# Patient Record
Sex: FEMALE | Race: WHITE | NOT HISPANIC OR LATINO
[De-identification: names, ages, dates, MRNs, and addresses within clinical notes are randomized per-mention and may not be internally consistent; named-entity substitution may affect disease eponyms.]

---

## 2017-04-26 ENCOUNTER — APPOINTMENT (OUTPATIENT)
Dept: CARDIOLOGY | Facility: CLINIC | Age: 82
End: 2017-04-26

## 2017-07-18 ENCOUNTER — RX RENEWAL (OUTPATIENT)
Age: 82
End: 2017-07-18

## 2017-10-23 ENCOUNTER — RX RENEWAL (OUTPATIENT)
Age: 82
End: 2017-10-23

## 2021-04-07 ENCOUNTER — OUTPATIENT (OUTPATIENT)
Dept: OUTPATIENT SERVICES | Facility: HOSPITAL | Age: 86
LOS: 1 days | Discharge: HOME | End: 2021-04-07
Payer: MEDICARE

## 2021-04-07 DIAGNOSIS — Z12.31 ENCOUNTER FOR SCREENING MAMMOGRAM FOR MALIGNANT NEOPLASM OF BREAST: ICD-10-CM

## 2021-04-07 DIAGNOSIS — R92.2 INCONCLUSIVE MAMMOGRAM: ICD-10-CM

## 2021-04-07 DIAGNOSIS — R92.8 OTHER ABNORMAL AND INCONCLUSIVE FINDINGS ON DIAGNOSTIC IMAGING OF BREAST: ICD-10-CM

## 2021-04-07 PROCEDURE — 76641 ULTRASOUND BREAST COMPLETE: CPT | Mod: 26,50

## 2021-04-07 PROCEDURE — 77067 SCR MAMMO BI INCL CAD: CPT | Mod: 26

## 2021-04-07 PROCEDURE — 77063 BREAST TOMOSYNTHESIS BI: CPT | Mod: 26

## 2022-04-17 ENCOUNTER — INPATIENT (INPATIENT)
Facility: HOSPITAL | Age: 87
LOS: 1 days | Discharge: HOME | End: 2022-04-19
Attending: HOSPITALIST | Admitting: HOSPITALIST
Payer: MEDICARE

## 2022-04-17 VITALS
TEMPERATURE: 98 F | HEART RATE: 71 BPM | HEIGHT: 67 IN | WEIGHT: 160.06 LBS | RESPIRATION RATE: 18 BRPM | SYSTOLIC BLOOD PRESSURE: 165 MMHG | DIASTOLIC BLOOD PRESSURE: 80 MMHG | OXYGEN SATURATION: 100 %

## 2022-04-17 LAB
ALBUMIN SERPL ELPH-MCNC: 4.5 G/DL — SIGNIFICANT CHANGE UP (ref 3.5–5.2)
ALP SERPL-CCNC: 56 U/L — SIGNIFICANT CHANGE UP (ref 30–115)
ALT FLD-CCNC: 20 U/L — SIGNIFICANT CHANGE UP (ref 0–41)
ANION GAP SERPL CALC-SCNC: 10 MMOL/L — SIGNIFICANT CHANGE UP (ref 7–14)
AST SERPL-CCNC: 19 U/L — SIGNIFICANT CHANGE UP (ref 0–41)
BASOPHILS # BLD AUTO: 0.03 K/UL — SIGNIFICANT CHANGE UP (ref 0–0.2)
BASOPHILS NFR BLD AUTO: 0.3 % — SIGNIFICANT CHANGE UP (ref 0–1)
BILIRUB SERPL-MCNC: 0.4 MG/DL — SIGNIFICANT CHANGE UP (ref 0.2–1.2)
BUN SERPL-MCNC: 14 MG/DL — SIGNIFICANT CHANGE UP (ref 10–20)
CALCIUM SERPL-MCNC: 9.8 MG/DL — SIGNIFICANT CHANGE UP (ref 8.5–10.1)
CHLORIDE SERPL-SCNC: 103 MMOL/L — SIGNIFICANT CHANGE UP (ref 98–110)
CO2 SERPL-SCNC: 29 MMOL/L — SIGNIFICANT CHANGE UP (ref 17–32)
CREAT SERPL-MCNC: 0.9 MG/DL — SIGNIFICANT CHANGE UP (ref 0.7–1.5)
EGFR: 63 ML/MIN/1.73M2 — SIGNIFICANT CHANGE UP
EOSINOPHIL # BLD AUTO: 0.13 K/UL — SIGNIFICANT CHANGE UP (ref 0–0.7)
EOSINOPHIL NFR BLD AUTO: 1.5 % — SIGNIFICANT CHANGE UP (ref 0–8)
FLUAV AG NPH QL: SIGNIFICANT CHANGE UP
FLUBV AG NPH QL: SIGNIFICANT CHANGE UP
GLUCOSE SERPL-MCNC: 119 MG/DL — HIGH (ref 70–99)
HCT VFR BLD CALC: 38.3 % — SIGNIFICANT CHANGE UP (ref 37–47)
HGB BLD-MCNC: 12.5 G/DL — SIGNIFICANT CHANGE UP (ref 12–16)
IMM GRANULOCYTES NFR BLD AUTO: 0.2 % — SIGNIFICANT CHANGE UP (ref 0.1–0.3)
LYMPHOCYTES # BLD AUTO: 1.61 K/UL — SIGNIFICANT CHANGE UP (ref 1.2–3.4)
LYMPHOCYTES # BLD AUTO: 18.4 % — LOW (ref 20.5–51.1)
MAGNESIUM SERPL-MCNC: 2 MG/DL — SIGNIFICANT CHANGE UP (ref 1.8–2.4)
MCHC RBC-ENTMCNC: 30 PG — SIGNIFICANT CHANGE UP (ref 27–31)
MCHC RBC-ENTMCNC: 32.6 G/DL — SIGNIFICANT CHANGE UP (ref 32–37)
MCV RBC AUTO: 92.1 FL — SIGNIFICANT CHANGE UP (ref 81–99)
MONOCYTES # BLD AUTO: 0.67 K/UL — HIGH (ref 0.1–0.6)
MONOCYTES NFR BLD AUTO: 7.7 % — SIGNIFICANT CHANGE UP (ref 1.7–9.3)
NEUTROPHILS # BLD AUTO: 6.27 K/UL — SIGNIFICANT CHANGE UP (ref 1.4–6.5)
NEUTROPHILS NFR BLD AUTO: 71.9 % — SIGNIFICANT CHANGE UP (ref 42.2–75.2)
NRBC # BLD: 0 /100 WBCS — SIGNIFICANT CHANGE UP (ref 0–0)
NT-PROBNP SERPL-SCNC: 78 PG/ML — SIGNIFICANT CHANGE UP (ref 0–300)
PLATELET # BLD AUTO: 286 K/UL — SIGNIFICANT CHANGE UP (ref 130–400)
POTASSIUM SERPL-MCNC: 4.3 MMOL/L — SIGNIFICANT CHANGE UP (ref 3.5–5)
POTASSIUM SERPL-SCNC: 4.3 MMOL/L — SIGNIFICANT CHANGE UP (ref 3.5–5)
PROT SERPL-MCNC: 7 G/DL — SIGNIFICANT CHANGE UP (ref 6–8)
RBC # BLD: 4.16 M/UL — LOW (ref 4.2–5.4)
RBC # FLD: 12.7 % — SIGNIFICANT CHANGE UP (ref 11.5–14.5)
RSV RNA NPH QL NAA+NON-PROBE: SIGNIFICANT CHANGE UP
SARS-COV-2 RNA SPEC QL NAA+PROBE: SIGNIFICANT CHANGE UP
SODIUM SERPL-SCNC: 142 MMOL/L — SIGNIFICANT CHANGE UP (ref 135–146)
TROPONIN T SERPL-MCNC: <0.01 NG/ML — SIGNIFICANT CHANGE UP
TROPONIN T SERPL-MCNC: <0.01 NG/ML — SIGNIFICANT CHANGE UP
WBC # BLD: 8.73 K/UL — SIGNIFICANT CHANGE UP (ref 4.8–10.8)
WBC # FLD AUTO: 8.73 K/UL — SIGNIFICANT CHANGE UP (ref 4.8–10.8)

## 2022-04-17 PROCEDURE — 99222 1ST HOSP IP/OBS MODERATE 55: CPT

## 2022-04-17 PROCEDURE — 93010 ELECTROCARDIOGRAM REPORT: CPT

## 2022-04-17 PROCEDURE — 99285 EMERGENCY DEPT VISIT HI MDM: CPT | Mod: FS,CS

## 2022-04-17 PROCEDURE — 71045 X-RAY EXAM CHEST 1 VIEW: CPT | Mod: 26

## 2022-04-17 RX ORDER — PANTOPRAZOLE SODIUM 20 MG/1
40 TABLET, DELAYED RELEASE ORAL
Refills: 0 | Status: DISCONTINUED | OUTPATIENT
Start: 2022-04-17 | End: 2022-04-19

## 2022-04-17 RX ORDER — SODIUM CHLORIDE 9 MG/ML
3 INJECTION INTRAMUSCULAR; INTRAVENOUS; SUBCUTANEOUS EVERY 8 HOURS
Refills: 0 | Status: DISCONTINUED | OUTPATIENT
Start: 2022-04-17 | End: 2022-04-19

## 2022-04-17 RX ORDER — AMLODIPINE BESYLATE 2.5 MG/1
5 TABLET ORAL DAILY
Refills: 0 | Status: DISCONTINUED | OUTPATIENT
Start: 2022-04-17 | End: 2022-04-19

## 2022-04-17 RX ORDER — ACETAMINOPHEN 500 MG
650 TABLET ORAL EVERY 6 HOURS
Refills: 0 | Status: DISCONTINUED | OUTPATIENT
Start: 2022-04-17 | End: 2022-04-19

## 2022-04-17 RX ORDER — AMLODIPINE BESYLATE 2.5 MG/1
1 TABLET ORAL
Qty: 0 | Refills: 0 | DISCHARGE

## 2022-04-17 RX ORDER — DULOXETINE HYDROCHLORIDE 30 MG/1
1 CAPSULE, DELAYED RELEASE ORAL
Qty: 0 | Refills: 0 | DISCHARGE

## 2022-04-17 RX ORDER — METOPROLOL TARTRATE 50 MG
1 TABLET ORAL
Qty: 0 | Refills: 0 | DISCHARGE

## 2022-04-17 RX ORDER — METOPROLOL TARTRATE 50 MG
50 TABLET ORAL DAILY
Refills: 0 | Status: DISCONTINUED | OUTPATIENT
Start: 2022-04-17 | End: 2022-04-19

## 2022-04-17 RX ORDER — ASPIRIN/CALCIUM CARB/MAGNESIUM 324 MG
81 TABLET ORAL DAILY
Refills: 0 | Status: DISCONTINUED | OUTPATIENT
Start: 2022-04-17 | End: 2022-04-19

## 2022-04-17 RX ORDER — CHLORHEXIDINE GLUCONATE 213 G/1000ML
1 SOLUTION TOPICAL
Refills: 0 | Status: DISCONTINUED | OUTPATIENT
Start: 2022-04-17 | End: 2022-04-19

## 2022-04-17 RX ORDER — NITROGLYCERIN 6.5 MG
0.4 CAPSULE, EXTENDED RELEASE ORAL
Refills: 0 | Status: DISCONTINUED | OUTPATIENT
Start: 2022-04-17 | End: 2022-04-19

## 2022-04-17 RX ORDER — OMEPRAZOLE 10 MG/1
1 CAPSULE, DELAYED RELEASE ORAL
Qty: 0 | Refills: 0 | DISCHARGE

## 2022-04-17 RX ORDER — ENOXAPARIN SODIUM 100 MG/ML
40 INJECTION SUBCUTANEOUS EVERY 24 HOURS
Refills: 0 | Status: DISCONTINUED | OUTPATIENT
Start: 2022-04-17 | End: 2022-04-19

## 2022-04-17 RX ORDER — DULOXETINE HYDROCHLORIDE 30 MG/1
60 CAPSULE, DELAYED RELEASE ORAL DAILY
Refills: 0 | Status: DISCONTINUED | OUTPATIENT
Start: 2022-04-17 | End: 2022-04-19

## 2022-04-17 RX ORDER — AMLODIPINE BESYLATE 2.5 MG/1
10 TABLET ORAL DAILY
Refills: 0 | Status: DISCONTINUED | OUTPATIENT
Start: 2022-04-17 | End: 2022-04-17

## 2022-04-17 RX ADMIN — AMLODIPINE BESYLATE 10 MILLIGRAM(S): 2.5 TABLET ORAL at 14:55

## 2022-04-17 RX ADMIN — PANTOPRAZOLE SODIUM 40 MILLIGRAM(S): 20 TABLET, DELAYED RELEASE ORAL at 14:55

## 2022-04-17 RX ADMIN — SODIUM CHLORIDE 3 MILLILITER(S): 9 INJECTION INTRAMUSCULAR; INTRAVENOUS; SUBCUTANEOUS at 14:37

## 2022-04-17 RX ADMIN — DULOXETINE HYDROCHLORIDE 60 MILLIGRAM(S): 30 CAPSULE, DELAYED RELEASE ORAL at 14:55

## 2022-04-17 RX ADMIN — SODIUM CHLORIDE 3 MILLILITER(S): 9 INJECTION INTRAMUSCULAR; INTRAVENOUS; SUBCUTANEOUS at 21:03

## 2022-04-17 RX ADMIN — Medication 50 MILLIGRAM(S): at 14:55

## 2022-04-17 RX ADMIN — Medication 81 MILLIGRAM(S): at 14:55

## 2022-04-17 RX ADMIN — ENOXAPARIN SODIUM 40 MILLIGRAM(S): 100 INJECTION SUBCUTANEOUS at 16:36

## 2022-04-17 NOTE — PATIENT PROFILE ADULT - FALL HARM RISK - HARM RISK INTERVENTIONS

## 2022-04-17 NOTE — ED PROVIDER NOTE - ATTENDING CONTRIBUTION TO CARE
86 yr old f w/ a pmh significant for aortic and mitral valve regurgitation who presents with chest pain. Pt states that she has been having chest pain and sob since yesterday. Pt reports that this occurs when she cleans around the home or/and goes up stairs. Pt has not had a cardiology evaluation in over 5 yrs. Pt denies any other medical complaints.     VITAL SIGNS: I have reviewed nursing notes and confirm.  CONSTITUTIONAL: non-toxic, well appearing  SKIN: no rash, no petechiae.  EYES: EOMI, pink conjunctiva, anicteric  ENT: tongue midline, no exudates, MMM  NECK: Supple; no meningismus, no JVD  CARD: RRR, no murmurs, equal radial pulses bilaterally 2+  RESP: CTAB, no respiratory distress  ABD: Soft, non-tender, non-distended, no peritoneal signs, no HSM, no CVA tenderness  EXT: Normal ROM x4.   NEURO: Alert, oriented x3.     a/p  86 yr old f that presents with chest pain   -labs  -ekg  -imaging  -consider admission

## 2022-04-17 NOTE — ED PROVIDER NOTE - NS ED ATTENDING STATEMENT MOD
This was a shared visit with the NONA. I reviewed and verified the documentation and independently performed the documented:

## 2022-04-17 NOTE — CONSULT NOTE ADULT - SUBJECTIVE AND OBJECTIVE BOX
HPI:  86 yr old f w/  pmh significant for aortic and mitral valve regurgitation/ HTN/ depression  who presents with chest pain. Pain onset was around 7am upon waking up and then pt. got nervous. Pain was a 5/10 substernal nonradiated. denies diaphoresis / jaw or shoulder pain . Pt reports that this occurs when she cleans around the home or/and goes up stairs. Upon coming to ER, pain resolved and was asymptomatic.  Pt denies any other medical complaints. denies nvd/ fever/ chills / abd pain    meds: Norvasc / Metoprolol / Cymbalta    visits cards MD regularly and has echo done 2-3x a year , last visit and echo 1 month ago and as per pt no new changes  (17 Apr 2022 11:12)        HPI-Cardiology   Pt evaluated at bedside. Currently admitted in telemetry for eval of Chest Pain Radiology tests and hospital records, were reviewed, as well as previous notes on this patient. Pt states that when she wok up this morning she suddenly developed midsternal CP. Describes the pain as sharp, intermittent, non radiating, 5/10 in intensity. Endorses worse on exertion when she ambulates or goes up the stairs. Pt denies any similar symptoms in the past. Endorses that about 6 years ago, she had LHC, after an abnormal stress test, and no stents or CAD. Pt states the follows up with her cardiologist Dr Luciano regularly and has an echo every 6 months due to having mitral valve regurgitation. Pt currently chest "discomfort" 1-2/10 in intensity, denies any SOB, palpitations, diaphoresis, chills, or nausea/vomiting          PAST MEDICAL & SURGICAL HISTORY  High blood pressure    GERD (gastroesophageal reflux disease)    Neuropathy        FAMILY HISTORY:  FAMILY HISTORY:      SOCIAL HISTORY:  []smoker-denies  []Alcohol-occasionally  []Drug-denies        ALLERGIES:  No Known Allergies      MEDICATIONS:  MEDICATIONS  (STANDING):  amLODIPine   Tablet 5 milliGRAM(s) Oral daily  aspirin  chewable 81 milliGRAM(s) Oral daily  chlorhexidine 4% Liquid 1 Application(s) Topical <User Schedule>  DULoxetine 60 milliGRAM(s) Oral daily  enoxaparin Injectable 40 milliGRAM(s) SubCutaneous every 24 hours  metoprolol succinate ER 50 milliGRAM(s) Oral daily  pantoprazole    Tablet 40 milliGRAM(s) Oral before breakfast  sodium chloride 0.9% lock flush 3 milliLiter(s) IV Push every 8 hours    MEDICATIONS  (PRN):  acetaminophen     Tablet .. 650 milliGRAM(s) Oral every 6 hours PRN Mild Pain (1 - 3)  nitroglycerin     SubLingual 0.4 milliGRAM(s) SubLingual every 5 minutes PRN Chest Pain      HOME MEDICATIONS:  Home Medications:  amLODIPine 10 mg oral tablet: 1 tab(s) orally once a day (17 Apr 2022 10:31)  Cymbalta 60 mg oral delayed release capsule: 1 cap(s) orally once a day (17 Apr 2022 10:31)  metoprolol succinate 50 mg oral tablet, extended release: 1 tab(s) orally once a day (17 Apr 2022 10:31)  PriLOSEC 20 mg oral delayed release capsule: 1 cap(s) orally once a day (17 Apr 2022 10:31)      VITALS:   T(F): 97 (04-17 @ 14:17), Max: 98 (04-17 @ 09:00)  HR: 69 (04-17 @ 14:17) (68 - 71)  BP: 130/61 (04-17 @ 14:17) (130/61 - 165/80)  BP(mean): --  RR: 18 (04-17 @ 09:00) (18 - 18)  SpO2: 100% (04-17 @ 09:00) (100% - 100%)        REVIEW OF SYSTEMS:  See HPI      PHYSICAL EXAM:  NEURO: patient is awake , alert and oriented  GEN: Not in acute distress  NECK: no thyroid enlargement, no JVD  LUNGS: Clear to auscultation bilaterally   CARDIOVASCULAR: S1/S2 present, RRR , no murmurs or rubs, no carotid bruits,  + PP bilaterally  ABD: Soft, non-tender, non-distended, +BS  EXT: No CHRIS  SKIN: Intact      LABS:                        12.5   8.73  )-----------( 286      ( 17 Apr 2022 09:30 )             38.3     04-17    142  |  103  |  14  ----------------------------<  119<H>  4.3   |  29  |  0.9    Ca    9.8      17 Apr 2022 09:30  Mg     2.0     04-17    TPro  7.0  /  Alb  4.5  /  TBili  0.4  /  DBili  x   /  AST  19  /  ALT  20  /  AlkPhos  56  04-17      Troponin T, Serum: <0.01 ng/mL (04-17-22 @ 15:11)  Troponin T, Serum: <0.01 ng/mL (04-17-22 @ 09:30)    CARDIAC MARKERS ( 17 Apr 2022 15:11 )  x     / <0.01 ng/mL / x     / x     / x      CARDIAC MARKERS ( 17 Apr 2022 09:30 )  x     / <0.01 ng/mL / x     / x     / x          Serum Pro-Brain Natriuretic Peptide: 78 pg/mL (04-17-22 @ 09:30)          RADIOLOGY:  -CXR:  < from: Xray Chest 1 View- PORTABLE-Urgent (04.17.22 @ 09:22) >  Impression:    Low lung volume.    Bibasilar opacities.    --- End of Report ---    < end of copied text >      ECG:  < from: 12 Lead ECG (04.17.22 @ 08:58) >  Sinus rhythm with occasional Premature ventricular complexes  Otherwise normal ECG    Confirmed by JOSH SCHMITZ MD ([id number]) on 4/17/2022 9:32:17 AM    < end of copied text >

## 2022-04-17 NOTE — ED PROVIDER NOTE - OBJECTIVE STATEMENT
86 year old female comes to emergency room for chest pain. Pt states that she has been having chest pain and sob since yesterday. Pt reports that this occurs when she cleans around the home or/and goes up stairs.

## 2022-04-17 NOTE — H&P ADULT - NS ATTEND AMEND GEN_ALL_CORE FT
Pt seen in 3S. Presents with non-radiating substernal pain at rest and associated with BUENROSTRO. Sounds like stable angina. Due to valvular issues (?), pt gets a TTE with her cardiologist [id number].     #stable angina  - telemetry  - Obtain recent TTE records as pt does not recall specific findings.   - ECG NSR with PVCs  - trop neg x1. obtain rop q 6hrs x2 with ecg  - nitroglycerin 0.4mg q5 mins x3 doses  - cardio consult  - if workup continues to be neg and pt is asymptomatic, op f/u with own cardiologist  - c/w aspirin and Toprol    #HTN  - c/w amlodipine    #depression  - c/w duloxetine    #GERD  - c/w ppi    #proph  - vte: lovenox

## 2022-04-17 NOTE — H&P ADULT - HISTORY OF PRESENT ILLNESS
86 yr old f w/  pmh significant for aortic and mitral valve regurgitation who presents with chest pain. Pt states that she has been having chest pain and sob since yesterday. Pt reports that this occurs when she cleans around the home or/and goes up stairs. Pt has not had a cardiology evaluation in over 5 yrs. Pt denies any other medical complaints.  86 yr old f w/  pmh significant for aortic and mitral valve regurgitation/ HTN/ depression  who presents with chest pain. Pt states that she has been having chest pain and sob since yesterday. Pt reports that this occurs when she cleans around the home or/and goes up stairs. Pt has not had a cardiology evaluation in over 5 yrs. Pt denies any other medical complaints.     meds: Norvasc / Metoprolol / Cymbalta   86 yr old f w/  pmh significant for aortic and mitral valve regurgitation/ HTN/ depression  who presents with chest pain. Pain onset was around 7am upon waking up and then pt. got nervous. Pain was a 5/10 substernal nonradiated. denies diaphoresis / jaw or shoulder pain . Pt reports that this occurs when she cleans around the home or/and goes up stairs. Upon coming to ER, pain resolved and was asymptomatic.  Pt denies any other medical complaints. denies nvd/ fever/ chills / abd pain    meds: Norvasc / Metoprolol / Cymbalta    visits cards MD regularly and has echo done 2-3x a year , last visit and echo 1 month ago and as per pt no new changes

## 2022-04-17 NOTE — ED ADULT NURSE NOTE - NSICDXPASTMEDICALHX_GEN_ALL_CORE_FT
PAST MEDICAL HISTORY:  GERD (gastroesophageal reflux disease)     High blood pressure     Neuropathy

## 2022-04-17 NOTE — H&P ADULT - NSHPPHYSICALEXAM_GEN_ALL_CORE
Vital Signs Last 24 Hrs  T(C): 36.7 (17 Apr 2022 09:00), Max: 36.7 (17 Apr 2022 09:00)  T(F): 98 (17 Apr 2022 09:00), Max: 98 (17 Apr 2022 09:00)  HR: 71 (17 Apr 2022 09:00) (71 - 71)  BP: 165/80 (17 Apr 2022 09:00) (165/80 - 165/80)  RR: 18 (17 Apr 2022 09:00) (18 - 18)  SpO2: 100% (17 Apr 2022 09:00) (100% - 100%)    Constitutional: well-nourished, appears stated age, no acute distress  Eyes: Conjunctiva pink, Sclera clear, EOMI.  Cardiovascular: S1 and S2, regular rate, regular rhythm, well-perfused extremities, radial pulses equal and 2+  Respiratory: unlabored respiratory effort, clear to auscultation bilaterally no wheezing, rales and rhonchi  Gastrointestinal: soft, non-tender abdomen, no pulsatile mass, normal bowl sounds  Musculoskeletal: supple neck, no lower extremity edema, no midline tenderness  Integumentary: warm, dry, no rash  Neurologic: awake, alert, cranial nerves II-XII grossly intact, extremities’ motor and sensory functions grossly intact  Psychiatric: appropriate mood, appropriate affect

## 2022-04-17 NOTE — H&P ADULT - NSHPLABSRESULTS_GEN_ALL_CORE
04-17    142  |  103  |  14  ----------------------------<  119<H>  4.3   |  29  |  0.9    Ca    9.8      17 Apr 2022 09:30  Mg     2.0     04-17    TPro  7.0  /  Alb  4.5  /  TBili  0.4  /  DBili  x   /  AST  19  /  ALT  20  /  AlkPhos  56  04-17                            12.5   8.73  )-----------( 286      ( 17 Apr 2022 09:30 )             38.3     CAPILLARY BLOOD GLUCOSE        CARDIAC MARKERS ( 17 Apr 2022 09:30 )  x     / <0.01 ng/mL / x     / x     / x        < from: Xray Chest 1 View- PORTABLE-Urgent (04.17.22 @ 09:22) >    Impression:    Low lung volume.    Bibasilar opacities.    < end of copied text >    < from: 12 Lead ECG (04.17.22 @ 08:58) >      Diagnosis Line Sinus rhythm with occasional Premature ventricular complexes  Otherwise normal ECG    < end of copied text >

## 2022-04-17 NOTE — CONSULT NOTE ADULT - NS ATTEND AMEND GEN_ALL_CORE FT
Patient yesterday chest pain at rest with belching . Hx ar mr. Today vague chest pain walking. If enzymes neg. Will consider adenocard thallium

## 2022-04-17 NOTE — H&P ADULT - ASSESSMENT
86 yr old f w/  pmh significant for aortic and mitral valve regurgitation who presents with chest pain, especially with exertion and SOB.     86 yr old f w/  pmh significant for HTN/ depression / aortic and mitral valve regurgitation who presents with chest pain, especially with exertion and SOB.     86 yr old f w/  pmh significant for HTN/ depression / aortic and mitral valve regurgitation who presents with chest pain, especially with exertion and SOB.  now asymptomatic     - Admit to inpatient level of care - non-ccu tele   - Ambulate w/ assistance  - AM labs   - CHG bath  - DASH low na diet   - VTE ppx: lovenox   - GI ppx: protonix      # CHEST PAIN - ANGINA , WITH H/O HTN AND VALVE REGURG.   # r/o ACS  - trop x 1 flat , will order trop at 3pm and in am   - cards consult   - nitro prn   - o2 as needed   - monitor vitals   - no echo needed for now   - poss d/c in am if w/u negative     # HTN   - c/w norvasc/ metoprolol   - low sodium diet     # depression   - c/w cymbalta     d/w Dr. Rawls  86 yr old f w/  pmh significant for HTN/ depression / aortic and mitral valve regurgitation who presents with chest pain, especially with exertion and SOB.  now asymptomatic     - Admit to inpatient level of care - non-ccu tele   - Ambulate w/ assistance  - AM labs   - CHG bath  - DASH low na diet   - VTE ppx: lovenox   - GI ppx: protonix      # CHEST PAIN - ANGINA , WITH H/O HTN AND VALVE REGURG.   # r/o ACS  - trop x 1 flat , will order trop at 3pm and in am   - cards consult   - nitro prn   - o2 as needed   - monitor vitals   - no echo needed for now; obtain recent TTE reports from OP cardiologist  - poss d/c in am if w/u negative     # HTN   - c/w norvasc/ metoprolol   - low sodium diet     # depression   - c/w cymbalta     d/w Dr. Rawls

## 2022-04-17 NOTE — H&P ADULT - NS_MD_PANP_GEN_ALL_CORE
Attending and PA/NP shared services statement (NON-critical care): Tarsorrhaphy Text: A tarsorrhaphy was performed using Frost sutures.

## 2022-04-17 NOTE — CONSULT NOTE ADULT - ASSESSMENT
86 yr old f w/  pmh significant for aortic and mitral valve regurgitation/ HTN/ depression who presented with chest pain      Impression:  #Chest Pain: r/o ACS  #HTN    -Currently endorses mild "chest discomfort"   -Troponin flat x2  -ECG: No ST changes. non-ischemic  -Cxr: Bibasilar opacities   -BNP 78      Plan:  -Trend trop x1 more  -CKMB, CK  -Lipid profile, A1C, TSH  -Repeat ECG in am  -C/w Toprol, ASA and Amlodipine  -C/w Nitro PRN   -If still experiencing CP, and ACS r/o, then inpatient pharmacologic stress test. If CP free and asymptomatic, then stress can be done outpatient. F/u with Dr Luciano outpatient  -Monitor lytes and replete as needed    Discussed with Dr Goodman

## 2022-04-18 ENCOUNTER — TRANSCRIPTION ENCOUNTER (OUTPATIENT)
Age: 87
End: 2022-04-18

## 2022-04-18 LAB
ANION GAP SERPL CALC-SCNC: 12 MMOL/L — SIGNIFICANT CHANGE UP (ref 7–14)
BASOPHILS # BLD AUTO: 0.02 K/UL — SIGNIFICANT CHANGE UP (ref 0–0.2)
BASOPHILS NFR BLD AUTO: 0.3 % — SIGNIFICANT CHANGE UP (ref 0–1)
BUN SERPL-MCNC: 15 MG/DL — SIGNIFICANT CHANGE UP (ref 10–20)
CALCIUM SERPL-MCNC: 9 MG/DL — SIGNIFICANT CHANGE UP (ref 8.5–10.1)
CHLORIDE SERPL-SCNC: 104 MMOL/L — SIGNIFICANT CHANGE UP (ref 98–110)
CO2 SERPL-SCNC: 24 MMOL/L — SIGNIFICANT CHANGE UP (ref 17–32)
CREAT SERPL-MCNC: 0.9 MG/DL — SIGNIFICANT CHANGE UP (ref 0.7–1.5)
EGFR: 63 ML/MIN/1.73M2 — SIGNIFICANT CHANGE UP
EOSINOPHIL # BLD AUTO: 0.14 K/UL — SIGNIFICANT CHANGE UP (ref 0–0.7)
EOSINOPHIL NFR BLD AUTO: 2.2 % — SIGNIFICANT CHANGE UP (ref 0–8)
GLUCOSE SERPL-MCNC: 116 MG/DL — HIGH (ref 70–99)
HCT VFR BLD CALC: 40.5 % — SIGNIFICANT CHANGE UP (ref 37–47)
HGB BLD-MCNC: 13.8 G/DL — SIGNIFICANT CHANGE UP (ref 12–16)
IMM GRANULOCYTES NFR BLD AUTO: 0.5 % — HIGH (ref 0.1–0.3)
LYMPHOCYTES # BLD AUTO: 2.14 K/UL — SIGNIFICANT CHANGE UP (ref 1.2–3.4)
LYMPHOCYTES # BLD AUTO: 33.2 % — SIGNIFICANT CHANGE UP (ref 20.5–51.1)
MAGNESIUM SERPL-MCNC: 2 MG/DL — SIGNIFICANT CHANGE UP (ref 1.8–2.4)
MCHC RBC-ENTMCNC: 31.6 PG — HIGH (ref 27–31)
MCHC RBC-ENTMCNC: 34.1 G/DL — SIGNIFICANT CHANGE UP (ref 32–37)
MCV RBC AUTO: 92.7 FL — SIGNIFICANT CHANGE UP (ref 81–99)
MONOCYTES # BLD AUTO: 0.54 K/UL — SIGNIFICANT CHANGE UP (ref 0.1–0.6)
MONOCYTES NFR BLD AUTO: 8.4 % — SIGNIFICANT CHANGE UP (ref 1.7–9.3)
NEUTROPHILS # BLD AUTO: 3.57 K/UL — SIGNIFICANT CHANGE UP (ref 1.4–6.5)
NEUTROPHILS NFR BLD AUTO: 55.4 % — SIGNIFICANT CHANGE UP (ref 42.2–75.2)
NRBC # BLD: 0 /100 WBCS — SIGNIFICANT CHANGE UP (ref 0–0)
PLATELET # BLD AUTO: 322 K/UL — SIGNIFICANT CHANGE UP (ref 130–400)
POTASSIUM SERPL-MCNC: 4.5 MMOL/L — SIGNIFICANT CHANGE UP (ref 3.5–5)
POTASSIUM SERPL-SCNC: 4.5 MMOL/L — SIGNIFICANT CHANGE UP (ref 3.5–5)
RBC # BLD: 4.37 M/UL — SIGNIFICANT CHANGE UP (ref 4.2–5.4)
RBC # FLD: 12.6 % — SIGNIFICANT CHANGE UP (ref 11.5–14.5)
SODIUM SERPL-SCNC: 140 MMOL/L — SIGNIFICANT CHANGE UP (ref 135–146)
TROPONIN T SERPL-MCNC: <0.01 NG/ML — SIGNIFICANT CHANGE UP
WBC # BLD: 6.44 K/UL — SIGNIFICANT CHANGE UP (ref 4.8–10.8)
WBC # FLD AUTO: 6.44 K/UL — SIGNIFICANT CHANGE UP (ref 4.8–10.8)

## 2022-04-18 PROCEDURE — 99231 SBSQ HOSP IP/OBS SF/LOW 25: CPT

## 2022-04-18 PROCEDURE — 99232 SBSQ HOSP IP/OBS MODERATE 35: CPT

## 2022-04-18 PROCEDURE — 93010 ELECTROCARDIOGRAM REPORT: CPT

## 2022-04-18 RX ORDER — ADENOSINE 3 MG/ML
60 INJECTION INTRAVENOUS ONCE
Refills: 0 | Status: DISCONTINUED | OUTPATIENT
Start: 2022-04-18 | End: 2022-04-19

## 2022-04-18 RX ORDER — ASPIRIN/CALCIUM CARB/MAGNESIUM 324 MG
1 TABLET ORAL
Qty: 0 | Refills: 0 | DISCHARGE
Start: 2022-04-18

## 2022-04-18 RX ADMIN — AMLODIPINE BESYLATE 5 MILLIGRAM(S): 2.5 TABLET ORAL at 06:10

## 2022-04-18 RX ADMIN — SODIUM CHLORIDE 3 MILLILITER(S): 9 INJECTION INTRAMUSCULAR; INTRAVENOUS; SUBCUTANEOUS at 15:50

## 2022-04-18 RX ADMIN — Medication 81 MILLIGRAM(S): at 11:45

## 2022-04-18 RX ADMIN — DULOXETINE HYDROCHLORIDE 60 MILLIGRAM(S): 30 CAPSULE, DELAYED RELEASE ORAL at 11:45

## 2022-04-18 RX ADMIN — SODIUM CHLORIDE 3 MILLILITER(S): 9 INJECTION INTRAMUSCULAR; INTRAVENOUS; SUBCUTANEOUS at 22:00

## 2022-04-18 RX ADMIN — PANTOPRAZOLE SODIUM 40 MILLIGRAM(S): 20 TABLET, DELAYED RELEASE ORAL at 06:11

## 2022-04-18 RX ADMIN — Medication 50 MILLIGRAM(S): at 06:11

## 2022-04-18 RX ADMIN — SODIUM CHLORIDE 3 MILLILITER(S): 9 INJECTION INTRAMUSCULAR; INTRAVENOUS; SUBCUTANEOUS at 06:08

## 2022-04-18 NOTE — DISCHARGE NOTE PROVIDER - CARE PROVIDER_API CALL
JAZMYN FRAGOSO  Internal Medicine  [address] [place] [address]  Phone: [phone]  Fax: [phone]  Established Patient  Follow Up Time: 1 week

## 2022-04-18 NOTE — DISCHARGE NOTE PROVIDER - NSDCMRMEDTOKEN_GEN_ALL_CORE_FT
amLODIPine 10 mg oral tablet: 1 tab(s) orally once a day  aspirin 81 mg oral tablet, chewable: 1 tab(s) orally once a day  Cymbalta 60 mg oral delayed release capsule: 1 cap(s) orally once a day  metoprolol succinate 50 mg oral tablet, extended release: 1 tab(s) orally once a day  PriLOSEC 20 mg oral delayed release capsule: 1 cap(s) orally once a day

## 2022-04-18 NOTE — PROGRESS NOTE ADULT - ASSESSMENT
86 yr old f w/  pmh significant for aortic and mitral valve regurgitation/ HTN/ depression who presented with chest pain    Impression:  #Chest Pain: r/o ACS  #HTN  -Currently endorses vague "chest discomfort" when walking  -Troponin flat -x3  -ECG: No ST changes. non-ischemiia  -Repeat ECG in am  -C/w Toprol, ASA and Amlodipine  -Adenocard thallium 04/19 in [place]  -will arrange pickup for 9:30am  -NPO after midnight      Discussed with Dr Goodman           86 yr old f w/  pmh significant for aortic and mitral valve regurgitation/ HTN/ depression who presented with chest pain    Impression:  #Chest Pain: r/o ACS  #HTN  -Currently endorses vague "chest discomfort" when walking  -Troponin flat -x3  -ECG: No ST changes. non-ischemiia  -Repeat ECG in am  -C/w Toprol, ASA and Amlodipine  -Adenocard thallium 04/19 in [place]  -will arrange pickup for 9:30am  -NPO after midnight, no caffeinated beverages      Discussed with Dr Goodman

## 2022-04-18 NOTE — PROGRESS NOTE ADULT - ASSESSMENT
86 yr old f w/  pmh significant for HTN/ depression / aortic and mitral valve regurgitation who presents with chest pain, especially with exertion and SOB.  now asymptomatic        # CHEST PAIN - ANGINA , WITH H/O HTN AND VALVE REGURG.   #EKG shows no acute ischemic changes.  trop negative X3  -Stress testing pending   -Cardiology to follow up        # HTN   - c/w norvasc/ metoprolol   - low sodium diet     # depression   - c/w cymbalta     #Progress Note Handoff  Pending (specify):  stress test  Family discussion:  plan of care was discussed with patient   in details.  all questions were answered.  seems to understand, and in agreement  Disposition:  unknown

## 2022-04-18 NOTE — DISCHARGE NOTE PROVIDER - HOSPITAL COURSE
86 yr old f w/  pmh significant for HTN/ depression / aortic and mitral valve regurgitation who presents with chest pain, especially with exertion and SOB.  now asymptomatic        # CHEST PAIN - ANGINA , WITH H/O HTN AND VALVE REGURG.   #EKG shows no acute ischemic changes.  trop negative X3  -Stress test in am

## 2022-04-19 ENCOUNTER — TRANSCRIPTION ENCOUNTER (OUTPATIENT)
Age: 87
End: 2022-04-19

## 2022-04-19 VITALS
DIASTOLIC BLOOD PRESSURE: 69 MMHG | SYSTOLIC BLOOD PRESSURE: 160 MMHG | HEART RATE: 61 BPM | RESPIRATION RATE: 18 BRPM | TEMPERATURE: 96 F

## 2022-04-19 PROCEDURE — 78452 HT MUSCLE IMAGE SPECT MULT: CPT | Mod: 26

## 2022-04-19 PROCEDURE — 93016 CV STRESS TEST SUPVJ ONLY: CPT

## 2022-04-19 PROCEDURE — 93018 CV STRESS TEST I&R ONLY: CPT

## 2022-04-19 PROCEDURE — 99239 HOSP IP/OBS DSCHRG MGMT >30: CPT

## 2022-04-19 RX ADMIN — ENOXAPARIN SODIUM 40 MILLIGRAM(S): 100 INJECTION SUBCUTANEOUS at 15:57

## 2022-04-19 RX ADMIN — SODIUM CHLORIDE 3 MILLILITER(S): 9 INJECTION INTRAMUSCULAR; INTRAVENOUS; SUBCUTANEOUS at 06:02

## 2022-04-19 RX ADMIN — DULOXETINE HYDROCHLORIDE 60 MILLIGRAM(S): 30 CAPSULE, DELAYED RELEASE ORAL at 15:56

## 2022-04-19 RX ADMIN — Medication 81 MILLIGRAM(S): at 15:56

## 2022-04-19 RX ADMIN — SODIUM CHLORIDE 3 MILLILITER(S): 9 INJECTION INTRAMUSCULAR; INTRAVENOUS; SUBCUTANEOUS at 15:54

## 2022-04-19 NOTE — PROGRESS NOTE ADULT - NS ATTEND AMEND GEN_ALL_CORE FT
Pt seen in 3S. Presents with non-radiating substernal pain at rest and associated with BUENROSTRO. Sounds like stable angina. Due to valvular issues (?), pt gets a TTE with her cardiologist [id number].     #stable angina  - telemetry  - Obtain recent TTE records as pt does not recall specific findings.   - ECG NSR with PVCs  - trop neg x1. obtain rop q 6hrs x2 with ecg  - nitroglycerin 0.4mg q5 mins x3 doses  - cardio consult  - if workup continues to be neg and pt is asymptomatic, op f/u with own cardiologist  - c/w aspirin and Toprol    #HTN  - c/w amlodipine    #depression  - c/w duloxetine    #GERD  - c/w ppi    #proph  - vte: lovenox
Pt seen in 3S. Presents with non-radiating substernal pain at rest and associated with BUENROSTRO. Sounds like stable angina. Due to valvular issues (?), pt gets a TTE with her cardiologist [id number].     #stable angina  - telemetry  - Obtain recent TTE records as pt does not recall specific findings.   - ECG NSR with PVCs  - trop neg x1. obtain rop q 6hrs x2 with ecg  - nitroglycerin 0.4mg q5 mins x3 doses  - cardio consult  - if workup continues to be neg and pt is asymptomatic, op f/u with own cardiologist  - c/w aspirin and Toprol    #HTN  - c/w amlodipine    #depression  - c/w duloxetine    #GERD  - c/w ppi    #proph  - vte: lovenox

## 2022-04-19 NOTE — PROGRESS NOTE ADULT - ASSESSMENT
86 yr old f w/  pmh significant for HTN/ depression / aortic and mitral valve regurgitation who presents with chest pain, especially with exertion and SOB.  now asymptomatic        # CHEST PAIN - ANGINA , WITH H/O HTN AND VALVE REGURG.   #EKG shows no acute ischemic changes.  trop negative X3  -Stress testing today  -Cardiology to follow up        # HTN   - c/w norvasc/ metoprolol   - low sodium diet     # depression   - c/w cymbalta     #Progress Note Handoff  Pending (specify):  stress test  Family discussion:  plan of care was discussed with patient   in details.  all questions were answered.  seems to understand, and in agreement  Disposition:  unknown

## 2022-04-19 NOTE — PROGRESS NOTE ADULT - SUBJECTIVE AND OBJECTIVE BOX
Subjective/Objective:     Pt evaluated at bedside, no overnight events, CP improved but worsened with exertion, offers no other complaints at this time    MEDICATIONS  (STANDING):  aDENosine Injectable (ADENOSCAN) 60 milliGRAM(s) IV Bolus once  amLODIPine   Tablet 5 milliGRAM(s) Oral daily  aspirin  chewable 81 milliGRAM(s) Oral daily  chlorhexidine 4% Liquid 1 Application(s) Topical <User Schedule>  DULoxetine 60 milliGRAM(s) Oral daily  enoxaparin Injectable 40 milliGRAM(s) SubCutaneous every 24 hours  metoprolol succinate ER 50 milliGRAM(s) Oral daily  pantoprazole    Tablet 40 milliGRAM(s) Oral before breakfast  sodium chloride 0.9% lock flush 3 milliLiter(s) IV Push every 8 hours    MEDICATIONS  (PRN):  acetaminophen     Tablet .. 650 milliGRAM(s) Oral every 6 hours PRN Mild Pain (1 - 3)  nitroglycerin     SubLingual 0.4 milliGRAM(s) SubLingual every 5 minutes PRN Chest Pain          Vital Signs Last 24 Hrs  T(C): 35.8 (18 Apr 2022 14:18), Max: 36.3 (17 Apr 2022 21:33)  T(F): 96.5 (18 Apr 2022 14:18), Max: 97.4 (17 Apr 2022 21:33)  HR: 61 (18 Apr 2022 14:18) (61 - 67)  BP: 141/60 (18 Apr 2022 14:18) (128/57 - 141/60)  BP(mean): --  RR: 18 (18 Apr 2022 14:18) (18 - 18)  SpO2: 95% (18 Apr 2022 06:14) (95% - 95%)  I&O's Detail          GENERAL:  87y/o Female NAD, resting comfortably.  HEAD:  Atraumatic, Normocephalic  EYES: EOMI, PERRLA, conjunctiva and sclera clear  NECK: Supple, No JVD, no cervical lymphadenopathy, non-tender  CHEST/LUNG: Clear to auscultation bilaterally; No wheeze, rhonchi, or rales  HEART: Regular rate and rhythm; S1&S2  ABDOMEN: Soft, Nontender, Nondistended x 4 quadrants; Bowel sounds present  EXTREMITIES:   Peripheral Pulses Present, No clubbing, no cyanosis, or no edema, no calf tenderness  PSYCH: AAOx3, cooperative, appropriate  NEUROLOGY: WNL  SKIN: WNL        EKG/ TELEM:    LABS:                          13.8   6.44  )-----------( 322      ( 18 Apr 2022 07:58 )             40.5       18 Apr 2022 07:58    140    |  104    |  15     ----------------------------<  116<H>  4.5     |  24     |  0.9    17 Apr 2022 09:30    142    |  103    |  14     ----------------------------<  119<H>  4.3     |  29     |  0.9      Ca    9.0        18 Apr 2022 07:58  Ca    9.8        17 Apr 2022 09:30  Mg     2.0       18 Apr 2022 07:58  Mg     2.0       17 Apr 2022 09:30    TPro  7.0    /  Alb  4.5    /  TBili  0.4    /  DBili  x      /  AST  19     /  ALT  20     /  AlkPhos  56     17 Apr 2022 09:30    CARDIAC MARKERS ( 18 Apr 2022 07:58 )  x     / <0.01 ng/mL / x     / x     / x      CARDIAC MARKERS ( 17 Apr 2022 15:11 )  x     / <0.01 ng/mL / x     / x     / x      CARDIAC MARKERS ( 17 Apr 2022 09:30 )  x     / <0.01 ng/mL / x     / x     / x                                
CC. CP  HPI.  Patient reports CP resolved  Offers no other complaints              Constitutional: No fever, fatigue or weight loss.  Skin: No rash.  Eyes: No recent vision problems or eye pain.  ENT: No congestion, ear pain, or sore throat.  Endocrine: No thyroid problems.  Cardiovascular: No chest pain or palpation.  Respiratory: No cough, shortness of breath, congestion, or wheezing.  Gastrointestinal: No abdominal pain, nausea, vomiting, or diarrhea.  Genitourinary: No dysuria.  Musculoskeletal: No joint swelling.  Neurologic: No headache.      Vital Signs Last 24 Hrs  T(C): 36.1 (19 Apr 2022 05:00), Max: 36.1 (19 Apr 2022 05:00)  T(F): 96.9 (19 Apr 2022 05:00), Max: 96.9 (19 Apr 2022 05:00)  HR: 59 (19 Apr 2022 05:00) (59 - 61)  BP: 120/67 (19 Apr 2022 05:00) (120/59 - 141/60)  BP(mean): --  RR: 18 (19 Apr 2022 05:00) (18 - 18)  SpO2: 97% (19 Apr 2022 06:03) (95% - 97%)      PHYSICAL EXAM-  GENERAL: NAD, well-groomed, well-developed  HEAD:  Atraumatic, Normocephalic  EYES: EOMI, PERRLA, conjunctiva and sclera clear  NECK: Supple, No JVD, Normal thyroid  NERVOUS SYSTEM:  Alert & Oriented X3, Motor Strength 5/5 B/L upper and lower extremities; DTRs 2+ intact and symmetric  CHEST/LUNG: Clear to percussion bilaterally; No rales, rhonchi, wheezing, or rubs  HEART: Regular rate and rhythm; No murmurs, rubs, or gallops  ABDOMEN: Soft, Nontender, Nondistended; Bowel sounds present  EXTREMITIES:  2+ Peripheral Pulses, No clubbing, cyanosis, or edema  SKIN: No rashes or lesions                               13.8   6.44  )-----------( 322      ( 18 Apr 2022 07:58 )             40.5     04-18    140  |  104  |  15  ----------------------------<  116<H>  4.5   |  24  |  0.9    Ca    9.0      18 Apr 2022 07:58  Mg     2.0     04-18      CARDIAC MARKERS ( 18 Apr 2022 07:58 )  x     / <0.01 ng/mL / x     / x     / x      CARDIAC MARKERS ( 17 Apr 2022 15:11 )  x     / <0.01 ng/mL / x     / x     / x                              MEDICATIONS  (STANDING):  aDENosine Injectable (ADENOSCAN) 60 milliGRAM(s) IV Bolus once  amLODIPine   Tablet 5 milliGRAM(s) Oral daily  aspirin  chewable 81 milliGRAM(s) Oral daily  chlorhexidine 4% Liquid 1 Application(s) Topical <User Schedule>  DULoxetine 60 milliGRAM(s) Oral daily  enoxaparin Injectable 40 milliGRAM(s) SubCutaneous every 24 hours  metoprolol succinate ER 50 milliGRAM(s) Oral daily  pantoprazole    Tablet 40 milliGRAM(s) Oral before breakfast  sodium chloride 0.9% lock flush 3 milliLiter(s) IV Push every 8 hours    MEDICATIONS  (PRN):  acetaminophen     Tablet .. 650 milliGRAM(s) Oral every 6 hours PRN Mild Pain (1 - 3)  nitroglycerin     SubLingual 0.4 milliGRAM(s) SubLingual every 5 minutes PRN Chest Pain          RADIOLOGY RESULTS:  
CC. CP  HPI.  Patient reports CP improved, but it still happens with ambulation  Offers no other complaints              Constitutional: No fever, fatigue or weight loss.  Skin: No rash.  Eyes: No recent vision problems or eye pain.  ENT: No congestion, ear pain, or sore throat.  Endocrine: No thyroid problems.  Cardiovascular: No chest pain or palpation.  Respiratory: No cough, shortness of breath, congestion, or wheezing.  Gastrointestinal: No abdominal pain, nausea, vomiting, or diarrhea.  Genitourinary: No dysuria.  Musculoskeletal: No joint swelling.  Neurologic: No headache.      Vital Signs Last 24 Hrs  T(C): 36 (04-18-22 @ 05:00), Max: 36.3 (04-17-22 @ 21:33)  T(F): 96.8 (04-18-22 @ 05:00), Max: 97.4 (04-17-22 @ 21:33)  HR: 66 (04-18-22 @ 05:00) (66 - 69)  BP: 128/57 (04-18-22 @ 05:00) (128/57 - 132/62)  BP(mean): --  RR: 18 (04-18-22 @ 05:00) (18 - 18)  SpO2: 95% (04-18-22 @ 06:14) (95% - 95%)        PHYSICAL EXAM-  GENERAL: NAD, well-groomed, well-developed  HEAD:  Atraumatic, Normocephalic  EYES: EOMI, PERRLA, conjunctiva and sclera clear  NECK: Supple, No JVD, Normal thyroid  NERVOUS SYSTEM:  Alert & Oriented X3, Motor Strength 5/5 B/L upper and lower extremities; DTRs 2+ intact and symmetric  CHEST/LUNG: Clear to percussion bilaterally; No rales, rhonchi, wheezing, or rubs  HEART: Regular rate and rhythm; No murmurs, rubs, or gallops  ABDOMEN: Soft, Nontender, Nondistended; Bowel sounds present  EXTREMITIES:  2+ Peripheral Pulses, No clubbing, cyanosis, or edema  SKIN: No rashes or lesions                                  13.8   6.44  )-----------( 322      ( 18 Apr 2022 07:58 )             40.5     04-18    140  |  104  |  15  ----------------------------<  116<H>  4.5   |  24  |  0.9    Ca    9.0      18 Apr 2022 07:58  Mg     2.0     04-18    TPro  7.0  /  Alb  4.5  /  TBili  0.4  /  DBili  x   /  AST  19  /  ALT  20  /  AlkPhos  56  04-17    CARDIAC MARKERS ( 18 Apr 2022 07:58 )  x     / <0.01 ng/mL / x     / x     / x      CARDIAC MARKERS ( 17 Apr 2022 15:11 )  x     / <0.01 ng/mL / x     / x     / x      CARDIAC MARKERS ( 17 Apr 2022 09:30 )  x     / <0.01 ng/mL / x     / x     / x                      MEDICATIONS  (STANDING):  aDENosine Injectable (ADENOSCAN) 60 milliGRAM(s) IV Bolus once  amLODIPine   Tablet 5 milliGRAM(s) Oral daily  aspirin  chewable 81 milliGRAM(s) Oral daily  chlorhexidine 4% Liquid 1 Application(s) Topical <User Schedule>  DULoxetine 60 milliGRAM(s) Oral daily  enoxaparin Injectable 40 milliGRAM(s) SubCutaneous every 24 hours  metoprolol succinate ER 50 milliGRAM(s) Oral daily  pantoprazole    Tablet 40 milliGRAM(s) Oral before breakfast  sodium chloride 0.9% lock flush 3 milliLiter(s) IV Push every 8 hours    MEDICATIONS  (PRN):  acetaminophen     Tablet .. 650 milliGRAM(s) Oral every 6 hours PRN Mild Pain (1 - 3)  nitroglycerin     SubLingual 0.4 milliGRAM(s) SubLingual every 5 minutes PRN Chest Pain          RADIOLOGY RESULTS:

## 2022-04-19 NOTE — DISCHARGE NOTE NURSING/CASE MANAGEMENT/SOCIAL WORK - PATIENT PORTAL LINK FT
You can access the FollowMyHealth Patient Portal offered by [facility] by registering at the following website: [email or web address]. By joining [email or web address]’s [email or web address] portal, you will also be able to view your health information using other applications (apps) compatible with our system.

## 2022-04-25 DIAGNOSIS — I08.0 RHEUMATIC DISORDERS OF BOTH MITRAL AND AORTIC VALVES: ICD-10-CM

## 2022-04-25 DIAGNOSIS — F32.A DEPRESSION, UNSPECIFIED: ICD-10-CM

## 2022-04-25 DIAGNOSIS — I20.8 OTHER FORMS OF ANGINA PECTORIS: ICD-10-CM

## 2022-04-25 DIAGNOSIS — R07.9 CHEST PAIN, UNSPECIFIED: ICD-10-CM

## 2022-04-25 DIAGNOSIS — I10 ESSENTIAL (PRIMARY) HYPERTENSION: ICD-10-CM

## 2022-04-25 DIAGNOSIS — K21.9 GASTRO-ESOPHAGEAL REFLUX DISEASE WITHOUT ESOPHAGITIS: ICD-10-CM

## 2023-02-04 PROBLEM — K21.9 GASTRO-ESOPHAGEAL REFLUX DISEASE WITHOUT ESOPHAGITIS: Chronic | Status: ACTIVE | Noted: 2022-04-17

## 2023-02-04 PROBLEM — G62.9 POLYNEUROPATHY, UNSPECIFIED: Chronic | Status: ACTIVE | Noted: 2022-04-17

## 2023-02-04 PROBLEM — I10 ESSENTIAL (PRIMARY) HYPERTENSION: Chronic | Status: ACTIVE | Noted: 2022-04-17

## 2023-02-17 ENCOUNTER — OUTPATIENT (OUTPATIENT)
Dept: OUTPATIENT SERVICES | Facility: HOSPITAL | Age: 88
LOS: 1 days | End: 2023-02-17
Payer: MEDICARE

## 2023-02-17 DIAGNOSIS — Z12.31 ENCOUNTER FOR SCREENING MAMMOGRAM FOR MALIGNANT NEOPLASM OF BREAST: ICD-10-CM

## 2023-02-17 DIAGNOSIS — Z00.8 ENCOUNTER FOR OTHER GENERAL EXAMINATION: ICD-10-CM

## 2023-02-17 PROCEDURE — 77063 BREAST TOMOSYNTHESIS BI: CPT | Mod: 26

## 2023-02-17 PROCEDURE — 77067 SCR MAMMO BI INCL CAD: CPT | Mod: 26

## 2023-02-17 PROCEDURE — 77063 BREAST TOMOSYNTHESIS BI: CPT

## 2023-02-17 PROCEDURE — 77067 SCR MAMMO BI INCL CAD: CPT

## 2024-03-06 ENCOUNTER — APPOINTMENT (OUTPATIENT)
Dept: NEUROSURGERY | Facility: CLINIC | Age: 89
End: 2024-03-06
Payer: MEDICARE

## 2024-03-06 DIAGNOSIS — M47.812 SPONDYLOSIS W/OUT MYELOPATHY OR RADICULOPATHY, CERVICAL REGION: ICD-10-CM

## 2024-03-06 PROCEDURE — 99203 OFFICE O/P NEW LOW 30 MIN: CPT
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
## 2024-03-06 NOTE — ASSESSMENT
[FreeTextEntry1] : Ms. OCONNELL is found to have diffuse cervical degenerative disc disease.  I have recommended extensive physical therapy for strengthening and posture improvements.  I am also referring her back to pain management, Dr. Ortega for cervical medial branch blocks/RFA.  The patient and her family member are agreeable.  She may follow-up as needed. Will call barring any issues.   I personally reviewed with the PA, this patient's history and physical exam findings, as documented above. I have discussed the relevant areas of concern, having direct implications to the presenting problems and illnesses, and I have personally examined all pertinent and positive and negative findings, which impact their neurosurgical treatment.  Shaina Lujan MS PA-C Senior Physician Assistant [facility]    Inocencia Gardiner MD FAANS Chair, Department of Neurosurgery [facility]

## 2024-04-11 ENCOUNTER — OUTPATIENT (OUTPATIENT)
Dept: OUTPATIENT SERVICES | Facility: HOSPITAL | Age: 89
LOS: 1 days | End: 2024-04-11
Payer: MEDICARE

## 2024-04-11 DIAGNOSIS — Z12.31 ENCOUNTER FOR SCREENING MAMMOGRAM FOR MALIGNANT NEOPLASM OF BREAST: ICD-10-CM

## 2024-04-11 PROCEDURE — 77067 SCR MAMMO BI INCL CAD: CPT

## 2024-04-11 PROCEDURE — 77063 BREAST TOMOSYNTHESIS BI: CPT

## 2024-04-11 PROCEDURE — 77067 SCR MAMMO BI INCL CAD: CPT | Mod: 26

## 2024-04-11 PROCEDURE — 77063 BREAST TOMOSYNTHESIS BI: CPT | Mod: 26

## 2024-04-12 DIAGNOSIS — Z12.31 ENCOUNTER FOR SCREENING MAMMOGRAM FOR MALIGNANT NEOPLASM OF BREAST: ICD-10-CM

## 2024-07-16 ENCOUNTER — OUTPATIENT (OUTPATIENT)
Dept: OUTPATIENT SERVICES | Facility: HOSPITAL | Age: 89
LOS: 1 days | End: 2024-07-16
Payer: MEDICARE

## 2024-07-16 DIAGNOSIS — Z00.8 ENCOUNTER FOR OTHER GENERAL EXAMINATION: ICD-10-CM

## 2024-07-16 DIAGNOSIS — G24.9 DYSTONIA, UNSPECIFIED: ICD-10-CM

## 2024-07-16 PROCEDURE — A9579: CPT

## 2024-07-16 PROCEDURE — 70553 MRI BRAIN STEM W/O & W/DYE: CPT | Mod: 26

## 2024-07-16 PROCEDURE — 70553 MRI BRAIN STEM W/O & W/DYE: CPT

## 2024-07-17 DIAGNOSIS — G24.9 DYSTONIA, UNSPECIFIED: ICD-10-CM

## 2025-04-26 ENCOUNTER — INPATIENT (INPATIENT)
Facility: HOSPITAL | Age: 89
LOS: 4 days | Discharge: HOME | DRG: 392 | End: 2025-05-01
Attending: INTERNAL MEDICINE | Admitting: INTERNAL MEDICINE
Payer: MEDICARE

## 2025-04-26 ENCOUNTER — EMERGENCY (EMERGENCY)
Facility: HOSPITAL | Age: 89
LOS: 0 days | Discharge: HOME | End: 2025-04-26
Attending: EMERGENCY MEDICINE
Payer: MEDICARE

## 2025-04-26 VITALS
SYSTOLIC BLOOD PRESSURE: 137 MMHG | RESPIRATION RATE: 18 BRPM | HEART RATE: 68 BPM | OXYGEN SATURATION: 95 % | TEMPERATURE: 98 F | DIASTOLIC BLOOD PRESSURE: 62 MMHG

## 2025-04-26 VITALS
RESPIRATION RATE: 18 BRPM | SYSTOLIC BLOOD PRESSURE: 123 MMHG | OXYGEN SATURATION: 96 % | DIASTOLIC BLOOD PRESSURE: 80 MMHG | HEART RATE: 70 BPM

## 2025-04-26 VITALS
DIASTOLIC BLOOD PRESSURE: 71 MMHG | WEIGHT: 160.06 LBS | TEMPERATURE: 98 F | HEART RATE: 86 BPM | SYSTOLIC BLOOD PRESSURE: 123 MMHG | OXYGEN SATURATION: 97 % | RESPIRATION RATE: 18 BRPM

## 2025-04-26 DIAGNOSIS — K21.9 GASTRO-ESOPHAGEAL REFLUX DISEASE WITHOUT ESOPHAGITIS: ICD-10-CM

## 2025-04-26 DIAGNOSIS — R11.0 NAUSEA: ICD-10-CM

## 2025-04-26 DIAGNOSIS — I10 ESSENTIAL (PRIMARY) HYPERTENSION: ICD-10-CM

## 2025-04-26 DIAGNOSIS — K57.92 DIVERTICULITIS OF INTESTINE, PART UNSPECIFIED, WITHOUT PERFORATION OR ABSCESS WITHOUT BLEEDING: ICD-10-CM

## 2025-04-26 DIAGNOSIS — Z87.19 PERSONAL HISTORY OF OTHER DISEASES OF THE DIGESTIVE SYSTEM: ICD-10-CM

## 2025-04-26 DIAGNOSIS — K57.32 DIVERTICULITIS OF LARGE INTESTINE WITHOUT PERFORATION OR ABSCESS WITHOUT BLEEDING: ICD-10-CM

## 2025-04-26 DIAGNOSIS — R10.9 UNSPECIFIED ABDOMINAL PAIN: ICD-10-CM

## 2025-04-26 DIAGNOSIS — R30.0 DYSURIA: ICD-10-CM

## 2025-04-26 LAB
ALBUMIN SERPL ELPH-MCNC: 3.9 G/DL — SIGNIFICANT CHANGE UP (ref 3.5–5.2)
ALBUMIN SERPL ELPH-MCNC: 4.2 G/DL — SIGNIFICANT CHANGE UP (ref 3.5–5.2)
ALP SERPL-CCNC: 58 U/L — SIGNIFICANT CHANGE UP (ref 30–115)
ALP SERPL-CCNC: 67 U/L — SIGNIFICANT CHANGE UP (ref 30–115)
ALT FLD-CCNC: 18 U/L — SIGNIFICANT CHANGE UP (ref 0–41)
ALT FLD-CCNC: 19 U/L — SIGNIFICANT CHANGE UP (ref 0–41)
ANION GAP SERPL CALC-SCNC: 13 MMOL/L — SIGNIFICANT CHANGE UP (ref 7–14)
ANION GAP SERPL CALC-SCNC: 9 MMOL/L — SIGNIFICANT CHANGE UP (ref 7–14)
APPEARANCE UR: CLEAR — SIGNIFICANT CHANGE UP
AST SERPL-CCNC: 18 U/L — SIGNIFICANT CHANGE UP (ref 0–41)
AST SERPL-CCNC: 34 U/L — SIGNIFICANT CHANGE UP (ref 0–41)
BASOPHILS # BLD AUTO: 0.02 K/UL — SIGNIFICANT CHANGE UP (ref 0–0.2)
BASOPHILS # BLD AUTO: 0.04 K/UL — SIGNIFICANT CHANGE UP (ref 0–0.2)
BASOPHILS NFR BLD AUTO: 0.2 % — SIGNIFICANT CHANGE UP (ref 0–1)
BASOPHILS NFR BLD AUTO: 0.4 % — SIGNIFICANT CHANGE UP (ref 0–1)
BILIRUB SERPL-MCNC: 0.4 MG/DL — SIGNIFICANT CHANGE UP (ref 0.2–1.2)
BILIRUB SERPL-MCNC: 0.6 MG/DL — SIGNIFICANT CHANGE UP (ref 0.2–1.2)
BILIRUB UR-MCNC: NEGATIVE — SIGNIFICANT CHANGE UP
BUN SERPL-MCNC: 16 MG/DL — SIGNIFICANT CHANGE UP (ref 10–20)
BUN SERPL-MCNC: 25 MG/DL — HIGH (ref 10–20)
CALCIUM SERPL-MCNC: 8.7 MG/DL — SIGNIFICANT CHANGE UP (ref 8.4–10.5)
CALCIUM SERPL-MCNC: 9.5 MG/DL — SIGNIFICANT CHANGE UP (ref 8.4–10.5)
CHLORIDE SERPL-SCNC: 102 MMOL/L — SIGNIFICANT CHANGE UP (ref 98–110)
CHLORIDE SERPL-SCNC: 102 MMOL/L — SIGNIFICANT CHANGE UP (ref 98–110)
CO2 SERPL-SCNC: 26 MMOL/L — SIGNIFICANT CHANGE UP (ref 17–32)
CO2 SERPL-SCNC: 27 MMOL/L — SIGNIFICANT CHANGE UP (ref 17–32)
COLOR SPEC: YELLOW — SIGNIFICANT CHANGE UP
CREAT SERPL-MCNC: 1.1 MG/DL — SIGNIFICANT CHANGE UP (ref 0.7–1.5)
CREAT SERPL-MCNC: 1.2 MG/DL — SIGNIFICANT CHANGE UP (ref 0.7–1.5)
DIFF PNL FLD: NEGATIVE — SIGNIFICANT CHANGE UP
EGFR: 44 ML/MIN/1.73M2 — LOW
EGFR: 44 ML/MIN/1.73M2 — LOW
EGFR: 48 ML/MIN/1.73M2 — LOW
EGFR: 48 ML/MIN/1.73M2 — LOW
EOSINOPHIL # BLD AUTO: 0.06 K/UL — SIGNIFICANT CHANGE UP (ref 0–0.7)
EOSINOPHIL # BLD AUTO: 0.13 K/UL — SIGNIFICANT CHANGE UP (ref 0–0.7)
EOSINOPHIL NFR BLD AUTO: 0.6 % — SIGNIFICANT CHANGE UP (ref 0–8)
EOSINOPHIL NFR BLD AUTO: 1.3 % — SIGNIFICANT CHANGE UP (ref 0–8)
GLUCOSE SERPL-MCNC: 104 MG/DL — HIGH (ref 70–99)
GLUCOSE SERPL-MCNC: 132 MG/DL — HIGH (ref 70–99)
GLUCOSE UR QL: >=1000 MG/DL
HCT VFR BLD CALC: 40.4 % — SIGNIFICANT CHANGE UP (ref 37–47)
HCT VFR BLD CALC: 41.6 % — SIGNIFICANT CHANGE UP (ref 37–47)
HGB BLD-MCNC: 13.2 G/DL — SIGNIFICANT CHANGE UP (ref 12–16)
HGB BLD-MCNC: 13.8 G/DL — SIGNIFICANT CHANGE UP (ref 12–16)
IMM GRANULOCYTES NFR BLD AUTO: 0.4 % — HIGH (ref 0.1–0.3)
IMM GRANULOCYTES NFR BLD AUTO: 0.5 % — HIGH (ref 0.1–0.3)
KETONES UR-MCNC: NEGATIVE MG/DL — SIGNIFICANT CHANGE UP
LACTATE SERPL-SCNC: 1.5 MMOL/L — SIGNIFICANT CHANGE UP (ref 0.7–2)
LEUKOCYTE ESTERASE UR-ACNC: NEGATIVE — SIGNIFICANT CHANGE UP
LIDOCAIN IGE QN: 26 U/L — SIGNIFICANT CHANGE UP (ref 7–60)
LYMPHOCYTES # BLD AUTO: 1.28 K/UL — SIGNIFICANT CHANGE UP (ref 1.2–3.4)
LYMPHOCYTES # BLD AUTO: 11.8 % — LOW (ref 20.5–51.1)
LYMPHOCYTES # BLD AUTO: 2.15 K/UL — SIGNIFICANT CHANGE UP (ref 1.2–3.4)
LYMPHOCYTES # BLD AUTO: 20.9 % — SIGNIFICANT CHANGE UP (ref 20.5–51.1)
MCHC RBC-ENTMCNC: 30.6 PG — SIGNIFICANT CHANGE UP (ref 27–31)
MCHC RBC-ENTMCNC: 30.7 PG — SIGNIFICANT CHANGE UP (ref 27–31)
MCHC RBC-ENTMCNC: 32.7 G/DL — SIGNIFICANT CHANGE UP (ref 32–37)
MCHC RBC-ENTMCNC: 33.2 G/DL — SIGNIFICANT CHANGE UP (ref 32–37)
MCV RBC AUTO: 92.7 FL — SIGNIFICANT CHANGE UP (ref 81–99)
MCV RBC AUTO: 93.7 FL — SIGNIFICANT CHANGE UP (ref 81–99)
MONOCYTES # BLD AUTO: 0.84 K/UL — HIGH (ref 0.1–0.6)
MONOCYTES # BLD AUTO: 0.97 K/UL — HIGH (ref 0.1–0.6)
MONOCYTES NFR BLD AUTO: 7.7 % — SIGNIFICANT CHANGE UP (ref 1.7–9.3)
MONOCYTES NFR BLD AUTO: 9.4 % — HIGH (ref 1.7–9.3)
NEUTROPHILS # BLD AUTO: 6.96 K/UL — HIGH (ref 1.4–6.5)
NEUTROPHILS # BLD AUTO: 8.6 K/UL — HIGH (ref 1.4–6.5)
NEUTROPHILS NFR BLD AUTO: 67.5 % — SIGNIFICANT CHANGE UP (ref 42.2–75.2)
NEUTROPHILS NFR BLD AUTO: 79.3 % — HIGH (ref 42.2–75.2)
NITRITE UR-MCNC: NEGATIVE — SIGNIFICANT CHANGE UP
NRBC BLD AUTO-RTO: 0 /100 WBCS — SIGNIFICANT CHANGE UP (ref 0–0)
NRBC BLD AUTO-RTO: 0 /100 WBCS — SIGNIFICANT CHANGE UP (ref 0–0)
PH UR: 6 — SIGNIFICANT CHANGE UP (ref 5–8)
PLATELET # BLD AUTO: 238 K/UL — SIGNIFICANT CHANGE UP (ref 130–400)
PLATELET # BLD AUTO: 298 K/UL — SIGNIFICANT CHANGE UP (ref 130–400)
PMV BLD: 10 FL — SIGNIFICANT CHANGE UP (ref 7.4–10.4)
PMV BLD: 10.4 FL — SIGNIFICANT CHANGE UP (ref 7.4–10.4)
POTASSIUM SERPL-MCNC: 4.4 MMOL/L — SIGNIFICANT CHANGE UP (ref 3.5–5)
POTASSIUM SERPL-MCNC: 5.4 MMOL/L — HIGH (ref 3.5–5)
POTASSIUM SERPL-SCNC: 4.4 MMOL/L — SIGNIFICANT CHANGE UP (ref 3.5–5)
POTASSIUM SERPL-SCNC: 5.4 MMOL/L — HIGH (ref 3.5–5)
PROT SERPL-MCNC: 6.5 G/DL — SIGNIFICANT CHANGE UP (ref 6–8)
PROT SERPL-MCNC: 6.8 G/DL — SIGNIFICANT CHANGE UP (ref 6–8)
PROT UR-MCNC: NEGATIVE MG/DL — SIGNIFICANT CHANGE UP
RBC # BLD: 4.31 M/UL — SIGNIFICANT CHANGE UP (ref 4.2–5.4)
RBC # BLD: 4.49 M/UL — SIGNIFICANT CHANGE UP (ref 4.2–5.4)
RBC # FLD: 12.8 % — SIGNIFICANT CHANGE UP (ref 11.5–14.5)
RBC # FLD: 12.8 % — SIGNIFICANT CHANGE UP (ref 11.5–14.5)
SODIUM SERPL-SCNC: 137 MMOL/L — SIGNIFICANT CHANGE UP (ref 135–146)
SODIUM SERPL-SCNC: 142 MMOL/L — SIGNIFICANT CHANGE UP (ref 135–146)
SP GR SPEC: 1.02 — SIGNIFICANT CHANGE UP (ref 1–1.03)
UROBILINOGEN FLD QL: 0.2 MG/DL — SIGNIFICANT CHANGE UP (ref 0.2–1)
WBC # BLD: 10.3 K/UL — SIGNIFICANT CHANGE UP (ref 4.8–10.8)
WBC # BLD: 10.84 K/UL — HIGH (ref 4.8–10.8)
WBC # FLD AUTO: 10.3 K/UL — SIGNIFICANT CHANGE UP (ref 4.8–10.8)
WBC # FLD AUTO: 10.84 K/UL — HIGH (ref 4.8–10.8)

## 2025-04-26 PROCEDURE — 81001 URINALYSIS AUTO W/SCOPE: CPT

## 2025-04-26 PROCEDURE — 80053 COMPREHEN METABOLIC PANEL: CPT

## 2025-04-26 PROCEDURE — 82962 GLUCOSE BLOOD TEST: CPT

## 2025-04-26 PROCEDURE — 87086 URINE CULTURE/COLONY COUNT: CPT

## 2025-04-26 PROCEDURE — 80048 BASIC METABOLIC PNL TOTAL CA: CPT

## 2025-04-26 PROCEDURE — 87040 BLOOD CULTURE FOR BACTERIA: CPT

## 2025-04-26 PROCEDURE — 85025 COMPLETE CBC W/AUTO DIFF WBC: CPT

## 2025-04-26 PROCEDURE — 99223 1ST HOSP IP/OBS HIGH 75: CPT

## 2025-04-26 PROCEDURE — 36415 COLL VENOUS BLD VENIPUNCTURE: CPT

## 2025-04-26 PROCEDURE — 81003 URINALYSIS AUTO W/O SCOPE: CPT

## 2025-04-26 PROCEDURE — 96375 TX/PRO/DX INJ NEW DRUG ADDON: CPT

## 2025-04-26 PROCEDURE — 83735 ASSAY OF MAGNESIUM: CPT

## 2025-04-26 PROCEDURE — L9997: CPT

## 2025-04-26 PROCEDURE — 93005 ELECTROCARDIOGRAM TRACING: CPT

## 2025-04-26 PROCEDURE — 74177 CT ABD & PELVIS W/CONTRAST: CPT | Mod: MC

## 2025-04-26 PROCEDURE — 83690 ASSAY OF LIPASE: CPT

## 2025-04-26 PROCEDURE — 99284 EMERGENCY DEPT VISIT MOD MDM: CPT | Mod: 25

## 2025-04-26 PROCEDURE — 74177 CT ABD & PELVIS W/CONTRAST: CPT | Mod: 26

## 2025-04-26 PROCEDURE — 99285 EMERGENCY DEPT VISIT HI MDM: CPT | Mod: FS

## 2025-04-26 PROCEDURE — 96374 THER/PROPH/DIAG INJ IV PUSH: CPT | Mod: XU

## 2025-04-26 PROCEDURE — 97162 PT EVAL MOD COMPLEX 30 MIN: CPT | Mod: GP

## 2025-04-26 PROCEDURE — ZZZZZ: CPT

## 2025-04-26 PROCEDURE — 83605 ASSAY OF LACTIC ACID: CPT

## 2025-04-26 RX ORDER — ACETAMINOPHEN 500 MG/5ML
650 LIQUID (ML) ORAL EVERY 6 HOURS
Refills: 0 | Status: DISCONTINUED | OUTPATIENT
Start: 2025-04-26 | End: 2025-05-01

## 2025-04-26 RX ORDER — METRONIDAZOLE 250 MG
TABLET ORAL
Refills: 0 | Status: DISCONTINUED | OUTPATIENT
Start: 2025-04-26 | End: 2025-05-01

## 2025-04-26 RX ORDER — KETOROLAC TROMETHAMINE 30 MG/ML
10 INJECTION, SOLUTION INTRAMUSCULAR; INTRAVENOUS ONCE
Refills: 0 | Status: DISCONTINUED | OUTPATIENT
Start: 2025-04-26 | End: 2025-04-26

## 2025-04-26 RX ORDER — AMLODIPINE BESYLATE 10 MG/1
5 TABLET ORAL DAILY
Refills: 0 | Status: DISCONTINUED | OUTPATIENT
Start: 2025-04-26 | End: 2025-05-01

## 2025-04-26 RX ORDER — AMLODIPINE BESYLATE 10 MG/1
1 TABLET ORAL
Refills: 0 | DISCHARGE

## 2025-04-26 RX ORDER — METRONIDAZOLE 250 MG
1 TABLET ORAL
Qty: 14 | Refills: 0
Start: 2025-04-26 | End: 2025-05-02

## 2025-04-26 RX ORDER — KETOROLAC TROMETHAMINE 30 MG/ML
15 INJECTION, SOLUTION INTRAMUSCULAR; INTRAVENOUS ONCE
Refills: 0 | Status: DISCONTINUED | OUTPATIENT
Start: 2025-04-26 | End: 2025-04-26

## 2025-04-26 RX ORDER — ENOXAPARIN SODIUM 100 MG/ML
40 INJECTION SUBCUTANEOUS EVERY 24 HOURS
Refills: 0 | Status: DISCONTINUED | OUTPATIENT
Start: 2025-04-26 | End: 2025-05-01

## 2025-04-26 RX ORDER — METRONIDAZOLE 250 MG
500 TABLET ORAL ONCE
Refills: 0 | Status: COMPLETED | OUTPATIENT
Start: 2025-04-26 | End: 2025-04-26

## 2025-04-26 RX ORDER — SENNA 187 MG
2 TABLET ORAL AT BEDTIME
Refills: 0 | Status: DISCONTINUED | OUTPATIENT
Start: 2025-04-27 | End: 2025-05-01

## 2025-04-26 RX ORDER — CIPROFLOXACIN HCL 250 MG
1 TABLET ORAL
Qty: 21 | Refills: 0
Start: 2025-04-26 | End: 2025-05-02

## 2025-04-26 RX ORDER — CIPROFLOXACIN HCL 250 MG
400 TABLET ORAL ONCE
Refills: 0 | Status: COMPLETED | OUTPATIENT
Start: 2025-04-26 | End: 2025-04-26

## 2025-04-26 RX ORDER — CEFTRIAXONE 500 MG/1
1000 INJECTION, POWDER, FOR SOLUTION INTRAMUSCULAR; INTRAVENOUS EVERY 24 HOURS
Refills: 0 | Status: DISCONTINUED | OUTPATIENT
Start: 2025-04-26 | End: 2025-04-27

## 2025-04-26 RX ORDER — ONDANSETRON HCL/PF 4 MG/2 ML
4 VIAL (ML) INJECTION ONCE
Refills: 0 | Status: COMPLETED | OUTPATIENT
Start: 2025-04-26 | End: 2025-04-26

## 2025-04-26 RX ORDER — METOPROLOL SUCCINATE 50 MG/1
50 TABLET, EXTENDED RELEASE ORAL DAILY
Refills: 0 | Status: DISCONTINUED | OUTPATIENT
Start: 2025-04-26 | End: 2025-05-01

## 2025-04-26 RX ORDER — POLYETHYLENE GLYCOL 3350 17 G/17G
17 POWDER, FOR SOLUTION ORAL DAILY
Refills: 0 | Status: COMPLETED | OUTPATIENT
Start: 2025-04-26 | End: 2025-04-28

## 2025-04-26 RX ORDER — DULOXETINE 20 MG/1
60 CAPSULE, DELAYED RELEASE ORAL DAILY
Refills: 0 | Status: DISCONTINUED | OUTPATIENT
Start: 2025-04-26 | End: 2025-05-01

## 2025-04-26 RX ORDER — METRONIDAZOLE 250 MG
500 TABLET ORAL EVERY 8 HOURS
Refills: 0 | Status: DISCONTINUED | OUTPATIENT
Start: 2025-04-27 | End: 2025-05-01

## 2025-04-26 RX ADMIN — Medication 1000 MILLILITER(S): at 06:05

## 2025-04-26 RX ADMIN — Medication 200 MILLIGRAM(S): at 21:50

## 2025-04-26 RX ADMIN — Medication 4 MILLIGRAM(S): at 06:05

## 2025-04-26 RX ADMIN — Medication 100 MILLIGRAM(S): at 21:50

## 2025-04-26 RX ADMIN — KETOROLAC TROMETHAMINE 15 MILLIGRAM(S): 30 INJECTION, SOLUTION INTRAMUSCULAR; INTRAVENOUS at 20:56

## 2025-04-26 RX ADMIN — Medication 4 MILLIGRAM(S): at 21:50

## 2025-04-26 RX ADMIN — Medication 4 MILLIGRAM(S): at 20:57

## 2025-04-26 NOTE — ED PROVIDER NOTE - NSCAREINITIATED _GEN_ER
[FreeTextEntry1] : Patrick is a 5 month old male presenting with shoulder asymmetry and mild spinal asymmetry. 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  Sprengel's deformity is a skeletal abnormality where one shoulder blade sits higher on the back than the other, which may be the cause of the shoulder asymmetry. This is often a cosmetic deformity with varying degrees of functional impairment. Mother understands at this time the patient is too young to make diagnosis and close observation is indicated. She understands that he may have limited ROM in shoulder. He should continue with physical therapy for neck and shoulder range of motion. He has no activity or ROM restrictions from our perspective at this time. He may use his right arm for all desired activities. A new prescription for PT was provided. Mother will follow-up with ENT for further facial asymmetry evaluation.\par \par As for mild spinal asymmetry, we will continue to monitor.\par \onel I recommended followup in 3 months for clinical evaluation. AP and lateral full spine x-rays to be done at that time, seated if possible.\par \par All questions and concerns were addressed today. Parent verbalizes understanding and agrees with plan of care.\par \par I, Lona Jackson, have acted as a scribe and documented the above information for Dr. Sheets. Jo Lew)

## 2025-04-26 NOTE — H&P ADULT - HISTORY OF PRESENT ILLNESS
89 yo F with hx of HTN, HLD, Diverticulitis, Depression and GERD presents to ED for abdominal pain started 1 day ago. Patient reports that she started feeling abdominal pain yesterday, located at left lower quadrant area. Patient was seen in ED this morning, found to have acute diverticulitis and was sent home on PO abx (cipro and flagyl) which she took 1 dose of cipro and 2 doses of flagyl without improvement in pain, prompting her to come back to ED again. Also reports nausea. Denied fever at home. However patient had fever of 100.5 in ED.

## 2025-04-26 NOTE — ED PROVIDER NOTE - PHYSICAL EXAMINATION
CONST: Well appearing in NAD  EYES: EOMI, Sclera and conjunctiva clear.   ENT: No nasal discharge. Oropharynx normal appearing, no erythema or exudates. Uvula midline.  NECK: Non-tender  CARD: Normal S1 S2; Normal rate and rhythm  RESP: Equal BS B/L, No wheezes, rhonchi or rales. No distress  GI: Soft, non-distended, generalized TTP abdomen  MS: Normal ROM in all extremities  SKIN: Warm, dry, Good turgor  NEURO: A&Ox3, No focal deficits. Strength 5/5 with no sensory deficits

## 2025-04-26 NOTE — ED PROVIDER NOTE - OBJECTIVE STATEMENT
89-year-old female PMH GERD, HTN, diverticulitis presenting to ED for evaluation of nausea, abdominal pain and dysuria.  Patient states symptoms have been present for 1 day.  Last bowel movement today.  Denies fever, chills, diarrhea, vomiting, chest pain, shortness of breath.

## 2025-04-26 NOTE — PATIENT PROFILE ADULT - FALL HARM RISK - RISK INTERVENTIONS

## 2025-04-26 NOTE — ED PROVIDER NOTE - ATTENDING APP SHARED VISIT CONTRIBUTION OF CARE
89-year-old female, history of DM, diverticulitis, chronic back pain, presents with abdominal pain and nausea for the past 2 days.  No vomiting or diarrhea.  Exam shows alert patient in no distress, HEENT NCAT PERRL, neck supple, lungs clear, RR S1S2, abdomen soft diffuse tenderness +BS, no CCE.

## 2025-04-26 NOTE — ED PROVIDER NOTE - ATTENDING APP SHARED VISIT CONTRIBUTION OF CARE
89-year-old female, history of DM, chronic back pain, was in ED yesterday for abdominal pain and diagnosed with diverticulitis.  Sent home on Cipro and Flagyl but back again because of severe abdominal pain.  Exam shows alert patient in no distress, HEENT NCAT PERRL, neck supple, lungs clear, RR S1S2, abdomen soft lower abdominal tenderness +BS, no CCE.

## 2025-04-26 NOTE — H&P ADULT - NSHPPHYSICALEXAM_GEN_ALL_CORE
PHYSICAL EXAM:  GEN: No acute distress. A & O x 4.   HEENT: EOMI, No sclera icterus. PEERLA.   LUNGS: Clear to auscultation bilaterally. No wheezing, rhonchi, rales.   HEART: S1/S2 present. RRR.   ABD: Soft,  non-distended. Bowel sounds present. + tenderness to palpation of left lower and mid lower area.   EXT: No pitting edema.   NEURO: CN II-XII intact. No focal deficit.

## 2025-04-26 NOTE — ED PROVIDER NOTE - PROGRESS NOTE DETAILS
Attempted p.o. pain medicine/nausea medication in the ED, patient still complaining of abdominal pain.  Will admit for IV antibiotics and pain control.

## 2025-04-26 NOTE — ED PROVIDER NOTE - NSICDXPASTMEDICALHX_GEN_ALL_CORE_FT
PAST MEDICAL HISTORY:  GERD (gastroesophageal reflux disease)     High blood pressure     Neuropathy     
calm

## 2025-04-26 NOTE — ED PROVIDER NOTE - PATIENT PORTAL LINK FT
You can access the FollowMyHealth Patient Portal offered by [facility] by registering at the following website: [email or web address]. By joining [email or web address]’s FollowMyHealth portal, you will also be able to view your health information using other applications (apps) compatible with our system.

## 2025-04-26 NOTE — ED PROVIDER NOTE - OBJECTIVE STATEMENT
Patient is an 89-year-old female PMH GERD, hypertension, diverticulitis coming to ED for abdominal pain.  Patient reports abdominal pain began yesterday, was evaluated in ED this morning and was diagnosed with diverticulitis, unable to tolerate pain at home so returning to ED.  Associated nausea.  Denies fever, vomiting, chest pain, shortness of breath, urinary symptoms

## 2025-04-26 NOTE — H&P ADULT - NSHPLABSRESULTS_GEN_ALL_CORE
13.8   10.30 )-----------( 298      ( 2025 06:23 )             41.6             04-    142  |  102  |  25[H]  ----------------------------<  132[H]  4.4   |  27  |  1.2    Ca    9.5      2025 06:23    TPro  6.8  /  Alb  4.2  /  TBili  0.4  /  DBili  x   /  AST  18  /  ALT  19  /  AlkPhos  67  04-26    LIVER FUNCTIONS - ( 2025 06:23 )  Alb: 4.2 g/dL / Pro: 6.8 g/dL / ALK PHOS: 67 U/L / ALT: 19 U/L / AST: 18 U/L / GGT: x                         Urinalysis Basic - ( 2025 09:18 )    Color: Yellow / Appearance: Clear / S.017 / pH: x  Gluc: x / Ketone: Negative mg/dL  / Bili: Negative / Urobili: 0.2 mg/dL   Blood: x / Protein: Negative mg/dL / Nitrite: Negative   Leuk Esterase: Negative / RBC: x / WBC x   Sq Epi: x / Non Sq Epi: x / Bacteria: x

## 2025-04-26 NOTE — ED PROVIDER NOTE - PHYSICAL EXAMINATION
GENERAL: Well-nourished, Well-developed. NAD.  ENMT: MMM.   CVS: Normal S1,S2. No murmurs appreciated on auscultation   RESP: No use of accessory muscles. Chest rise symmetrical with good expansion. Lungs clear to auscultation B/L. No wheezing, rales, or rhonchi auscultated.  GI: Normal auscultation of bowel sounds in all 4 quadrants. (+)diffuse abd ttp. Soft, Nondistended. No guarding or rebound tenderness. No CVAT B/L.  Skin: Warm, Dry. No rashes or lesions. Good cap refill < 2 sec B/L.  EXT: Radial and pedal pulses present B/L. No calf tenderness or swelling B/L. No palpable cords. No pedal edema B/L.

## 2025-04-26 NOTE — H&P ADULT - ASSESSMENT
88 yo F with hx of HTN, HLD, Diverticulitis, Depression and GERD presents to ED for abdominal pain.     Left lower abdominal pain likely 2/2 acute sigmoid diverticulitis   - CT abdomen: Findings compatible with sigmoid diverticulitis/colitis.  - ceftriaxone and flagyl   - pain control prn with bowel regimen.   - clear liquid diet   - supportive care.   - Recommend outpatient GI for colonoscopy 4-6 weeks after acute diverticulitis resolved.     HTN/HLD - c/w home med.   GERD - PPI     DVT ppx: Lovenox SC  GI ppx: PPI  Diet: clear liquid diet, advanced as tolerated.   Activity: as tolerated.     Date seen by the [email or web address]  I have spent 75 minutes of time on the encounter which excludes teaching and/or separately reported services.  88 yo F with hx of HTN, HLD, Diverticulitis, Depression and GERD presents to ED for abdominal pain.     Left lower abdominal pain likely 2/2 acute sigmoid diverticulitis   - CT abdomen: Findings compatible with sigmoid diverticulitis/colitis.  - ceftriaxone and flagyl   - pain control prn with bowel regimen.   - full liquid diet   - supportive care.   - Recommend outpatient GI for colonoscopy 4-6 weeks after acute diverticulitis resolved.     HTN/HLD - c/w home med.   GERD - PPI     DVT ppx: Lovenox SC  GI ppx: PPI  Diet: full liquid diet, advanced as tolerated.   Activity: as tolerated.     Date seen by the [email or web address]  I have spent 75 minutes of time on the encounter which excludes teaching and/or separately reported services.

## 2025-04-26 NOTE — ED PROVIDER NOTE - CLINICAL SUMMARY MEDICAL DECISION MAKING FREE TEXT BOX
89-year-old female, history of DM, chronic back pain, diagnosed with diverticulitis in ED yesterday and sent home on Cipro and Flagyl, back because of severe abdominal pain.  Given Toradol and Zofran without improvement in ED.  Will order labs, morphine, Cipro and Flagyl and admit.

## 2025-04-27 LAB
ALBUMIN SERPL ELPH-MCNC: 4 G/DL — SIGNIFICANT CHANGE UP (ref 3.5–5.2)
ALP SERPL-CCNC: 60 U/L — SIGNIFICANT CHANGE UP (ref 30–115)
ALT FLD-CCNC: 14 U/L — SIGNIFICANT CHANGE UP (ref 0–41)
ANION GAP SERPL CALC-SCNC: 14 MMOL/L — SIGNIFICANT CHANGE UP (ref 7–14)
AST SERPL-CCNC: 15 U/L — SIGNIFICANT CHANGE UP (ref 0–41)
BASOPHILS # BLD AUTO: 0.03 K/UL — SIGNIFICANT CHANGE UP (ref 0–0.2)
BASOPHILS # BLD AUTO: 0.04 K/UL — SIGNIFICANT CHANGE UP (ref 0–0.2)
BASOPHILS NFR BLD AUTO: 0.2 % — SIGNIFICANT CHANGE UP (ref 0–1)
BASOPHILS NFR BLD AUTO: 0.4 % — SIGNIFICANT CHANGE UP (ref 0–1)
BILIRUB SERPL-MCNC: 0.8 MG/DL — SIGNIFICANT CHANGE UP (ref 0.2–1.2)
BUN SERPL-MCNC: 14 MG/DL — SIGNIFICANT CHANGE UP (ref 10–20)
CALCIUM SERPL-MCNC: 8.8 MG/DL — SIGNIFICANT CHANGE UP (ref 8.4–10.5)
CHLORIDE SERPL-SCNC: 101 MMOL/L — SIGNIFICANT CHANGE UP (ref 98–110)
CO2 SERPL-SCNC: 25 MMOL/L — SIGNIFICANT CHANGE UP (ref 17–32)
CREAT SERPL-MCNC: 1 MG/DL — SIGNIFICANT CHANGE UP (ref 0.7–1.5)
EGFR: 54 ML/MIN/1.73M2 — LOW
EGFR: 54 ML/MIN/1.73M2 — LOW
EOSINOPHIL # BLD AUTO: 0.07 K/UL — SIGNIFICANT CHANGE UP (ref 0–0.7)
EOSINOPHIL # BLD AUTO: 0.1 K/UL — SIGNIFICANT CHANGE UP (ref 0–0.7)
EOSINOPHIL NFR BLD AUTO: 0.6 % — SIGNIFICANT CHANGE UP (ref 0–8)
EOSINOPHIL NFR BLD AUTO: 1.1 % — SIGNIFICANT CHANGE UP (ref 0–8)
GLUCOSE SERPL-MCNC: 113 MG/DL — HIGH (ref 70–99)
HCT VFR BLD CALC: 39.3 % — SIGNIFICANT CHANGE UP (ref 37–47)
HCT VFR BLD CALC: 41 % — SIGNIFICANT CHANGE UP (ref 37–47)
HGB BLD-MCNC: 13 G/DL — SIGNIFICANT CHANGE UP (ref 12–16)
HGB BLD-MCNC: 13.4 G/DL — SIGNIFICANT CHANGE UP (ref 12–16)
IMM GRANULOCYTES NFR BLD AUTO: 0.3 % — SIGNIFICANT CHANGE UP (ref 0.1–0.3)
IMM GRANULOCYTES NFR BLD AUTO: 0.4 % — HIGH (ref 0.1–0.3)
LYMPHOCYTES # BLD AUTO: 1.63 K/UL — SIGNIFICANT CHANGE UP (ref 1.2–3.4)
LYMPHOCYTES # BLD AUTO: 1.76 K/UL — SIGNIFICANT CHANGE UP (ref 1.2–3.4)
LYMPHOCYTES # BLD AUTO: 13.1 % — LOW (ref 20.5–51.1)
LYMPHOCYTES # BLD AUTO: 19.6 % — LOW (ref 20.5–51.1)
MAGNESIUM SERPL-MCNC: 2.3 MG/DL — SIGNIFICANT CHANGE UP (ref 1.8–2.4)
MCHC RBC-ENTMCNC: 30.7 PG — SIGNIFICANT CHANGE UP (ref 27–31)
MCHC RBC-ENTMCNC: 31 PG — SIGNIFICANT CHANGE UP (ref 27–31)
MCHC RBC-ENTMCNC: 32.7 G/DL — SIGNIFICANT CHANGE UP (ref 32–37)
MCHC RBC-ENTMCNC: 33.1 G/DL — SIGNIFICANT CHANGE UP (ref 32–37)
MCV RBC AUTO: 93.8 FL — SIGNIFICANT CHANGE UP (ref 81–99)
MCV RBC AUTO: 93.8 FL — SIGNIFICANT CHANGE UP (ref 81–99)
MONOCYTES # BLD AUTO: 0.64 K/UL — HIGH (ref 0.1–0.6)
MONOCYTES # BLD AUTO: 0.99 K/UL — HIGH (ref 0.1–0.6)
MONOCYTES NFR BLD AUTO: 7.1 % — SIGNIFICANT CHANGE UP (ref 1.7–9.3)
MONOCYTES NFR BLD AUTO: 8 % — SIGNIFICANT CHANGE UP (ref 1.7–9.3)
NEUTROPHILS # BLD AUTO: 6.43 K/UL — SIGNIFICANT CHANGE UP (ref 1.4–6.5)
NEUTROPHILS # BLD AUTO: 9.64 K/UL — HIGH (ref 1.4–6.5)
NEUTROPHILS NFR BLD AUTO: 71.5 % — SIGNIFICANT CHANGE UP (ref 42.2–75.2)
NEUTROPHILS NFR BLD AUTO: 77.7 % — HIGH (ref 42.2–75.2)
NRBC BLD AUTO-RTO: 0 /100 WBCS — SIGNIFICANT CHANGE UP (ref 0–0)
NRBC BLD AUTO-RTO: 0 /100 WBCS — SIGNIFICANT CHANGE UP (ref 0–0)
PLATELET # BLD AUTO: 259 K/UL — SIGNIFICANT CHANGE UP (ref 130–400)
PLATELET # BLD AUTO: 293 K/UL — SIGNIFICANT CHANGE UP (ref 130–400)
PMV BLD: 10.2 FL — SIGNIFICANT CHANGE UP (ref 7.4–10.4)
PMV BLD: 9.8 FL — SIGNIFICANT CHANGE UP (ref 7.4–10.4)
POTASSIUM SERPL-MCNC: 4.4 MMOL/L — SIGNIFICANT CHANGE UP (ref 3.5–5)
POTASSIUM SERPL-SCNC: 4.4 MMOL/L — SIGNIFICANT CHANGE UP (ref 3.5–5)
PROT SERPL-MCNC: 6.3 G/DL — SIGNIFICANT CHANGE UP (ref 6–8)
RBC # BLD: 4.19 M/UL — LOW (ref 4.2–5.4)
RBC # BLD: 4.37 M/UL — SIGNIFICANT CHANGE UP (ref 4.2–5.4)
RBC # FLD: 12.8 % — SIGNIFICANT CHANGE UP (ref 11.5–14.5)
RBC # FLD: 12.9 % — SIGNIFICANT CHANGE UP (ref 11.5–14.5)
SODIUM SERPL-SCNC: 140 MMOL/L — SIGNIFICANT CHANGE UP (ref 135–146)
WBC # BLD: 12.41 K/UL — HIGH (ref 4.8–10.8)
WBC # BLD: 9 K/UL — SIGNIFICANT CHANGE UP (ref 4.8–10.8)
WBC # FLD AUTO: 12.41 K/UL — HIGH (ref 4.8–10.8)
WBC # FLD AUTO: 9 K/UL — SIGNIFICANT CHANGE UP (ref 4.8–10.8)

## 2025-04-27 PROCEDURE — 99233 SBSQ HOSP IP/OBS HIGH 50: CPT

## 2025-04-27 PROCEDURE — 93010 ELECTROCARDIOGRAM REPORT: CPT

## 2025-04-27 RX ORDER — MELATONIN 5 MG
3 TABLET ORAL AT BEDTIME
Refills: 0 | Status: DISCONTINUED | OUTPATIENT
Start: 2025-04-27 | End: 2025-05-01

## 2025-04-27 RX ORDER — CIPROFLOXACIN HCL 250 MG
400 TABLET ORAL EVERY 12 HOURS
Refills: 0 | Status: DISCONTINUED | OUTPATIENT
Start: 2025-04-27 | End: 2025-05-01

## 2025-04-27 RX ADMIN — Medication 100 MILLIGRAM(S): at 21:09

## 2025-04-27 RX ADMIN — Medication 100 MILLIGRAM(S): at 13:54

## 2025-04-27 RX ADMIN — Medication 3 MILLIGRAM(S): at 22:28

## 2025-04-27 RX ADMIN — Medication 100 MILLIGRAM(S): at 05:13

## 2025-04-27 RX ADMIN — Medication 200 MILLIGRAM(S): at 10:28

## 2025-04-27 RX ADMIN — Medication 40 MILLIGRAM(S): at 05:13

## 2025-04-27 RX ADMIN — Medication 200 MILLIGRAM(S): at 17:15

## 2025-04-27 RX ADMIN — Medication 2 TABLET(S): at 21:09

## 2025-04-27 RX ADMIN — DULOXETINE 60 MILLIGRAM(S): 20 CAPSULE, DELAYED RELEASE ORAL at 11:42

## 2025-04-27 NOTE — PROGRESS NOTE ADULT - SUBJECTIVE AND OBJECTIVE BOX
SUBJECTIVE:    Patient is a 89y old Female who presents with a chief complaint of Abdominal pain (26 Apr 2025 22:11)    Currently admitted to medicine with the primary diagnosis of Diverticulitis       Today is hospital day 1d. This morning she is resting comfortably in bed and reports no new issues or overnight events. reports improved abdominal discomfort , tolerated diet well this morning           PAST MEDICAL & SURGICAL HISTORY  High blood pressure    GERD (gastroesophageal reflux disease)    Neuropathy      SOCIAL HISTORY:    ALLERGIES:      MEDICATIONS:  STANDING MEDICATIONS  amLODIPine   Tablet 5 milliGRAM(s) Oral daily  cefTRIAXone   IVPB 1000 milliGRAM(s) IV Intermittent every 24 hours  DULoxetine 60 milliGRAM(s) Oral daily  enoxaparin Injectable 40 milliGRAM(s) SubCutaneous every 24 hours  metoprolol succinate ER 50 milliGRAM(s) Oral daily  metroNIDAZOLE  IVPB      metroNIDAZOLE  IVPB 500 milliGRAM(s) IV Intermittent every 8 hours  pantoprazole    Tablet 40 milliGRAM(s) Oral before breakfast  polyethylene glycol 3350 17 Gram(s) Oral daily  senna 2 Tablet(s) Oral at bedtime    PRN MEDICATIONS  acetaminophen     Tablet .. 650 milliGRAM(s) Oral every 6 hours PRN  morphine  - Injectable 2 milliGRAM(s) IV Push every 4 hours PRN    VITALS:   T(F): 98.4  HR: 73  BP: 106/56  RR: 18  SpO2: 92%    LABS:                          13.0   12.41 )-----------( 259      ( 27 Apr 2025 00:42 )             39.3     04-26    137  |  102  |  16  ----------------------------<  104[H]  5.4[H]   |  26  |  1.1    Ca    8.7      26 Apr 2025 21:50    TPro  6.5  /  Alb  3.9  /  TBili  0.6  /  DBili  x   /  AST  34  /  ALT  18  /  AlkPhos  58  04-26      Urinalysis Basic - ( 26 Apr 2025 21:50 )    Color: x / Appearance: x / SG: x / pH: x  Gluc: 104 mg/dL / Ketone: x  / Bili: x / Urobili: x   Blood: x / Protein: x / Nitrite: x   Leuk Esterase: x / RBC: x / WBC x   Sq Epi: x / Non Sq Epi: x / Bacteria: x      RADIOLOGY:    PHYSICAL EXAM:  GEN: No acute distress  HEENT: normocephalic, atraumatic, aniceteric  LUNGS: Clear to auscultation bilaterally, no rales/wheezing/ rhonchi  HEART: S1/S2 present. RRR, no murmurs  ABD: Soft, non-tender, non-distended. Bowel sounds present  EXT: warm   NEURO: AAOX3, normal affect      ASSESSMENT AND PLAN:    89 yo F with hx of HTN, HLD, Diverticulitis, Depression and GERD presents to ED for abdominal pain started 1 day ago. Patient reports that she started feeling abdominal pain yesterday, located at left lower quadrant area. Patient was seen in ED this morning, found to have acute diverticulitis and was sent home on PO abx (cipro and flagyl) which she took 1 dose of cipro and 2 doses of flagyl without improvement in pain, prompting her to come back to ED again. Also reports nausea. Denied fever at home. However patient had fever of 100.5 in ED. 89 yo F with hx of HTN, HLD, Diverticulitis, Depression and GERD presents to ED for abdominal pain started 1 day ago. Patient reports that she started feeling abdominal pain yesterday, located at left lower quadrant area. Patient was seen in ED this morning, found to have acute diverticulitis and was sent home on PO abx (cipro and flagyl) which she took 1 dose of cipro and 2 doses of flagyl without improvement in pain, prompting her to come back to ED again. Also reports nausea. Denied fever at home. However patient had fever of 100.5 in ED.     # Acute diverticulitis   89 yo F with hx of HTN, HLD, Diverticulitis, Depression and GERD presents to ED for abdominal pain.     # Acute sigmoid diverticulitis - symptoms improving   - CT abdomen: Findings compatible with sigmoid diverticulitis/colitis  - monitor wbc count   - fu blood clx sent from ED   - Cipro/ Flagyl ; check QTc level   - Outpatient GI for colonoscopy 4-6 weeks after acute diverticulitis resolved     # H/O HTN - on amlodipine 5 mg qd at home     dvt/ gi ppx/diet  dispo: acute- poss 24 hrs   family discussion: spoke to daughter Tri Ayala on 4/27 - all questions answered , agreeing with plan  SUBJECTIVE:    Patient is a 89y old Female who presents with a chief complaint of Abdominal pain (26 Apr 2025 22:11)    Currently admitted to medicine with the primary diagnosis of Diverticulitis       Today is hospital day 1d. This morning she is resting comfortably in bed and reports no new issues or overnight events. reports improved abdominal discomfort , tolerated diet well this morning           PAST MEDICAL & SURGICAL HISTORY  High blood pressure    GERD (gastroesophageal reflux disease)    Neuropathy      SOCIAL HISTORY:    ALLERGIES:      MEDICATIONS:  STANDING MEDICATIONS  amLODIPine   Tablet 5 milliGRAM(s) Oral daily  cefTRIAXone   IVPB 1000 milliGRAM(s) IV Intermittent every 24 hours  DULoxetine 60 milliGRAM(s) Oral daily  enoxaparin Injectable 40 milliGRAM(s) SubCutaneous every 24 hours  metoprolol succinate ER 50 milliGRAM(s) Oral daily  metroNIDAZOLE  IVPB      metroNIDAZOLE  IVPB 500 milliGRAM(s) IV Intermittent every 8 hours  pantoprazole    Tablet 40 milliGRAM(s) Oral before breakfast  polyethylene glycol 3350 17 Gram(s) Oral daily  senna 2 Tablet(s) Oral at bedtime    PRN MEDICATIONS  acetaminophen     Tablet .. 650 milliGRAM(s) Oral every 6 hours PRN  morphine  - Injectable 2 milliGRAM(s) IV Push every 4 hours PRN    VITALS:   T(F): 98.4  HR: 73  BP: 106/56  RR: 18  SpO2: 92%    LABS:                          13.0   12.41 )-----------( 259      ( 27 Apr 2025 00:42 )             39.3     04-26    137  |  102  |  16  ----------------------------<  104[H]  5.4[H]   |  26  |  1.1    Ca    8.7      26 Apr 2025 21:50    TPro  6.5  /  Alb  3.9  /  TBili  0.6  /  DBili  x   /  AST  34  /  ALT  18  /  AlkPhos  58  04-26      Urinalysis Basic - ( 26 Apr 2025 21:50 )    Color: x / Appearance: x / SG: x / pH: x  Gluc: 104 mg/dL / Ketone: x  / Bili: x / Urobili: x   Blood: x / Protein: x / Nitrite: x   Leuk Esterase: x / RBC: x / WBC x   Sq Epi: x / Non Sq Epi: x / Bacteria: x      RADIOLOGY:    PHYSICAL EXAM:  GEN: No acute distress  HEENT: normocephalic, atraumatic, aniceteric  LUNGS: Clear to auscultation bilaterally, no rales/wheezing/ rhonchi  HEART: S1/S2 present. RRR, no murmurs  ABD: Soft, non-tender, non-distended. Bowel sounds present  EXT: warm   NEURO: AAOX3, normal affect      ASSESSMENT AND PLAN:    89 yo F with hx of HTN, HLD, Diverticulitis, Depression and GERD presents to ED for abdominal pain started 1 day ago. Patient reports that she started feeling abdominal pain yesterday, located at left lower quadrant area. Patient was seen in ED this morning, found to have acute diverticulitis and was sent home on PO abx (cipro and flagyl) which she took 1 dose of cipro and 2 doses of flagyl without improvement in pain, prompting her to come back to ED again. Also reports nausea. Denied fever at home. However patient had fever of 100.5 in ED. 89 yo F with hx of HTN, HLD, Diverticulitis, Depression and GERD presents to ED for abdominal pain started 1 day ago. Patient reports that she started feeling abdominal pain yesterday, located at left lower quadrant area. Patient was seen in ED this morning, found to have acute diverticulitis and was sent home on PO abx (cipro and flagyl) which she took 1 dose of cipro and 2 doses of flagyl without improvement in pain, prompting her to come back to ED again. Also reports nausea. Denied fever at home. However patient had fever of 100.5 in ED.     # Acute diverticulitis   89 yo F with hx of HTN, HLD, Diverticulitis, Depression and GERD presents to ED for abdominal pain.     # Acute sigmoid diverticulitis - symptoms improving   - CT abdomen: Findings compatible with sigmoid diverticulitis/colitis  - monitor wbc count   - fu blood clx sent from ED   - Cipro/ Flagyl ; check QTc level   - Advance diet as tolerated   - Outpatient GI for colonoscopy 4-6 weeks after acute diverticulitis resolved     # H/O HTN - on amlodipine 5 mg qd at home     dvt/ gi ppx/diet  dispo: acute- poss 24 hrs   family discussion: spoke to daughter Tri Ayala on 4/27 - all questions answered , agreeing with plan

## 2025-04-28 LAB
ANION GAP SERPL CALC-SCNC: 9 MMOL/L — SIGNIFICANT CHANGE UP (ref 7–14)
APPEARANCE UR: CLEAR — SIGNIFICANT CHANGE UP
BACTERIA # UR AUTO: ABNORMAL /HPF
BASOPHILS # BLD AUTO: 0.04 K/UL — SIGNIFICANT CHANGE UP (ref 0–0.2)
BASOPHILS NFR BLD AUTO: 0.6 % — SIGNIFICANT CHANGE UP (ref 0–1)
BILIRUB UR-MCNC: NEGATIVE — SIGNIFICANT CHANGE UP
BUN SERPL-MCNC: 10 MG/DL — SIGNIFICANT CHANGE UP (ref 10–20)
CALCIUM SERPL-MCNC: 8.7 MG/DL — SIGNIFICANT CHANGE UP (ref 8.4–10.5)
CHLORIDE SERPL-SCNC: 104 MMOL/L — SIGNIFICANT CHANGE UP (ref 98–110)
CO2 SERPL-SCNC: 28 MMOL/L — SIGNIFICANT CHANGE UP (ref 17–32)
COLOR SPEC: YELLOW — SIGNIFICANT CHANGE UP
CREAT SERPL-MCNC: 1.1 MG/DL — SIGNIFICANT CHANGE UP (ref 0.7–1.5)
CULTURE RESULTS: SIGNIFICANT CHANGE UP
DIFF PNL FLD: NEGATIVE — SIGNIFICANT CHANGE UP
EGFR: 48 ML/MIN/1.73M2 — LOW
EGFR: 48 ML/MIN/1.73M2 — LOW
EOSINOPHIL # BLD AUTO: 0.16 K/UL — SIGNIFICANT CHANGE UP (ref 0–0.7)
EOSINOPHIL NFR BLD AUTO: 2.4 % — SIGNIFICANT CHANGE UP (ref 0–8)
EPI CELLS # UR: PRESENT
GLUCOSE BLDC GLUCOMTR-MCNC: 130 MG/DL — HIGH (ref 70–99)
GLUCOSE SERPL-MCNC: 131 MG/DL — HIGH (ref 70–99)
GLUCOSE UR QL: 250 MG/DL
HCT VFR BLD CALC: 40.3 % — SIGNIFICANT CHANGE UP (ref 37–47)
HGB BLD-MCNC: 13.2 G/DL — SIGNIFICANT CHANGE UP (ref 12–16)
IMM GRANULOCYTES NFR BLD AUTO: 0.6 % — HIGH (ref 0.1–0.3)
KETONES UR-MCNC: NEGATIVE MG/DL — SIGNIFICANT CHANGE UP
LEUKOCYTE ESTERASE UR-ACNC: ABNORMAL
LYMPHOCYTES # BLD AUTO: 1.5 K/UL — SIGNIFICANT CHANGE UP (ref 1.2–3.4)
LYMPHOCYTES # BLD AUTO: 22.4 % — SIGNIFICANT CHANGE UP (ref 20.5–51.1)
MCHC RBC-ENTMCNC: 30.8 PG — SIGNIFICANT CHANGE UP (ref 27–31)
MCHC RBC-ENTMCNC: 32.8 G/DL — SIGNIFICANT CHANGE UP (ref 32–37)
MCV RBC AUTO: 94.2 FL — SIGNIFICANT CHANGE UP (ref 81–99)
MONOCYTES # BLD AUTO: 0.61 K/UL — HIGH (ref 0.1–0.6)
MONOCYTES NFR BLD AUTO: 9.1 % — SIGNIFICANT CHANGE UP (ref 1.7–9.3)
NEUTROPHILS # BLD AUTO: 4.35 K/UL — SIGNIFICANT CHANGE UP (ref 1.4–6.5)
NEUTROPHILS NFR BLD AUTO: 64.9 % — SIGNIFICANT CHANGE UP (ref 42.2–75.2)
NITRITE UR-MCNC: NEGATIVE — SIGNIFICANT CHANGE UP
NRBC BLD AUTO-RTO: 0 /100 WBCS — SIGNIFICANT CHANGE UP (ref 0–0)
PH UR: 6.5 — SIGNIFICANT CHANGE UP (ref 5–8)
PLATELET # BLD AUTO: 288 K/UL — SIGNIFICANT CHANGE UP (ref 130–400)
PMV BLD: 10.2 FL — SIGNIFICANT CHANGE UP (ref 7.4–10.4)
POTASSIUM SERPL-MCNC: 4.6 MMOL/L — SIGNIFICANT CHANGE UP (ref 3.5–5)
POTASSIUM SERPL-SCNC: 4.6 MMOL/L — SIGNIFICANT CHANGE UP (ref 3.5–5)
PROT UR-MCNC: NEGATIVE MG/DL — SIGNIFICANT CHANGE UP
RBC # BLD: 4.28 M/UL — SIGNIFICANT CHANGE UP (ref 4.2–5.4)
RBC # FLD: 12.8 % — SIGNIFICANT CHANGE UP (ref 11.5–14.5)
RBC CASTS # UR COMP ASSIST: 2 /HPF — SIGNIFICANT CHANGE UP (ref 0–4)
SODIUM SERPL-SCNC: 141 MMOL/L — SIGNIFICANT CHANGE UP (ref 135–146)
SP GR SPEC: 1.01 — SIGNIFICANT CHANGE UP (ref 1–1.03)
SPECIMEN SOURCE: SIGNIFICANT CHANGE UP
SQUAMOUS # UR AUTO: 25 /HPF — HIGH (ref 0–5)
UROBILINOGEN FLD QL: 0.2 MG/DL — SIGNIFICANT CHANGE UP (ref 0.2–1)
WBC # BLD: 6.7 K/UL — SIGNIFICANT CHANGE UP (ref 4.8–10.8)
WBC # FLD AUTO: 6.7 K/UL — SIGNIFICANT CHANGE UP (ref 4.8–10.8)
WBC UR QL: 7 /HPF — HIGH (ref 0–5)

## 2025-04-28 PROCEDURE — 99232 SBSQ HOSP IP/OBS MODERATE 35: CPT

## 2025-04-28 PROCEDURE — 36000 PLACE NEEDLE IN VEIN: CPT

## 2025-04-28 PROCEDURE — 76937 US GUIDE VASCULAR ACCESS: CPT | Mod: 26,59

## 2025-04-28 RX ADMIN — Medication 40 MILLIGRAM(S): at 05:10

## 2025-04-28 RX ADMIN — Medication 3 MILLIGRAM(S): at 22:48

## 2025-04-28 RX ADMIN — AMLODIPINE BESYLATE 5 MILLIGRAM(S): 10 TABLET ORAL at 05:09

## 2025-04-28 RX ADMIN — METOPROLOL SUCCINATE 50 MILLIGRAM(S): 50 TABLET, EXTENDED RELEASE ORAL at 05:10

## 2025-04-28 RX ADMIN — Medication 200 MILLIGRAM(S): at 05:09

## 2025-04-28 RX ADMIN — Medication 100 MILLIGRAM(S): at 05:09

## 2025-04-28 RX ADMIN — Medication 200 MILLIGRAM(S): at 18:23

## 2025-04-28 RX ADMIN — Medication 100 MILLIGRAM(S): at 22:47

## 2025-04-28 RX ADMIN — POLYETHYLENE GLYCOL 3350 17 GRAM(S): 17 POWDER, FOR SOLUTION ORAL at 12:31

## 2025-04-28 RX ADMIN — DULOXETINE 60 MILLIGRAM(S): 20 CAPSULE, DELAYED RELEASE ORAL at 12:30

## 2025-04-28 NOTE — PROGRESS NOTE ADULT - SUBJECTIVE AND OBJECTIVE BOX
SUBJECTIVE:    Patient is a 89y old Female who presents with a chief complaint of Abdominal pain (27 Apr 2025 09:57)    Currently admitted to medicine with the primary diagnosis of Diverticulitis       Today is hospital day 2d. This morning she is resting comfortably in bed - reports suprapubic discomfort and increased urinary frequency           PAST MEDICAL & SURGICAL HISTORY  High blood pressure    GERD (gastroesophageal reflux disease)    Neuropathy      SOCIAL HISTORY:    ALLERGIES:  No Known Allergies    MEDICATIONS:  STANDING MEDICATIONS  amLODIPine   Tablet 5 milliGRAM(s) Oral daily  chlorhexidine 2% Cloths 1 Application(s) Topical <User Schedule>  ciprofloxacin   IVPB 400 milliGRAM(s) IV Intermittent every 12 hours  DULoxetine 60 milliGRAM(s) Oral daily  enoxaparin Injectable 40 milliGRAM(s) SubCutaneous every 24 hours  metoprolol succinate ER 50 milliGRAM(s) Oral daily  metroNIDAZOLE  IVPB      metroNIDAZOLE  IVPB 500 milliGRAM(s) IV Intermittent every 8 hours  pantoprazole    Tablet 40 milliGRAM(s) Oral before breakfast  senna 2 Tablet(s) Oral at bedtime    PRN MEDICATIONS  acetaminophen     Tablet .. 650 milliGRAM(s) Oral every 6 hours PRN  melatonin 3 milliGRAM(s) Oral at bedtime PRN    VITALS:   T(F): 98  HR: 76  BP: 132/77  RR: 18  SpO2: 95%    LABS:  Negative for smoking/alcohol/drug use.                         13.2   6.70  )-----------( 288      ( 28 Apr 2025 06:06 )             40.3     04-28    141  |  104  |  10  ----------------------------<  131[H]  4.6   |  28  |  1.1    Ca    8.7      28 Apr 2025 06:06  Mg     2.3     04-27    TPro  6.3  /  Alb  4.0  /  TBili  0.8  /  DBili  x   /  AST  15  /  ALT  14  /  AlkPhos  60  04-27      Urinalysis Basic - ( 28 Apr 2025 06:06 )    Color: x / Appearance: x / SG: x / pH: x  Gluc: 131 mg/dL / Ketone: x  / Bili: x / Urobili: x   Blood: x / Protein: x / Nitrite: x   Leuk Esterase: x / RBC: x / WBC x   Sq Epi: x / Non Sq Epi: x / Bacteria: x      RADIOLOGY:    PHYSICAL EXAM:  GEN: No acute distress  HEENT: normocephalic, atraumatic, aniceteric  LUNGS: Clear to auscultation bilaterally, no rales/wheezing/ rhonchi  HEART: S1/S2 present. RRR, no murmurs  ABD: Soft,mild tender spc , non-distended. Bowel sounds present  EXT: warm  NEURO: AAOX3, normal affect      ASSESSMENT AND PLAN:    89 yo F with hx of HTN, HLD, Diverticulitis, Depression and GERD presents to ED for abdominal pain started 1 day ago. Patient reports that she started feeling abdominal pain yesterday, located at left lower quadrant area. Patient was seen in ED this morning, found to have acute diverticulitis and was sent home on PO abx (cipro and flagyl) which she took 1 dose of cipro and 2 doses of flagyl without improvement in pain, prompting her to come back to ED again. Also reports nausea. Denied fever at home. However patient had fever of 100.5 in ED. 89 yo F with hx of HTN, HLD, Diverticulitis, Depression and GERD presents to ED for abdominal pain started 1 day ago. Patient reports that she started feeling abdominal pain yesterday, located at left lower quadrant area. Patient was seen in ED this morning, found to have acute diverticulitis and was sent home on PO abx (cipro and flagyl) which she took 1 dose of cipro and 2 doses of flagyl without improvement in pain, prompting her to come back to ED again. Also reports nausea. Denied fever at home. However patient had fever of 100.5 in ED.     # Acute diverticulitis     # Acute sigmoid diverticulitis - symptoms improving   - CT abdomen: Findings compatible with sigmoid diverticulitis/colitis  - monitor wbc count   - fu blood clx sent from ED   - Cipro/ Flagyl ; check QTc level   - Advance diet as tolerated   - Outpatient GI for colonoscopy 4-6 weeks after acute diverticulitis resolved     # R/O UTI   - reported suprapubic discomfort and increased urinary frequency on 4/28   - check bladder scan   - u/a      # H/O HTN - on amlodipine 5 mg qd at home     dvt/ gi ppx/diet  dispo: acute- poss 24 hrs   family discussion: spoke to daughter Tri Ayala on 4/27 - all questions answered , agreeing with plan

## 2025-04-29 LAB
ANION GAP SERPL CALC-SCNC: 7 MMOL/L — SIGNIFICANT CHANGE UP (ref 7–14)
BASOPHILS # BLD AUTO: 0.02 K/UL — SIGNIFICANT CHANGE UP (ref 0–0.2)
BASOPHILS NFR BLD AUTO: 0.4 % — SIGNIFICANT CHANGE UP (ref 0–1)
BUN SERPL-MCNC: 8 MG/DL — LOW (ref 10–20)
CALCIUM SERPL-MCNC: 9.1 MG/DL — SIGNIFICANT CHANGE UP (ref 8.4–10.5)
CHLORIDE SERPL-SCNC: 105 MMOL/L — SIGNIFICANT CHANGE UP (ref 98–110)
CO2 SERPL-SCNC: 28 MMOL/L — SIGNIFICANT CHANGE UP (ref 17–32)
CREAT SERPL-MCNC: 1.1 MG/DL — SIGNIFICANT CHANGE UP (ref 0.7–1.5)
EGFR: 48 ML/MIN/1.73M2 — LOW
EGFR: 48 ML/MIN/1.73M2 — LOW
EOSINOPHIL # BLD AUTO: 0.21 K/UL — SIGNIFICANT CHANGE UP (ref 0–0.7)
EOSINOPHIL NFR BLD AUTO: 4.1 % — SIGNIFICANT CHANGE UP (ref 0–8)
GLUCOSE SERPL-MCNC: 111 MG/DL — HIGH (ref 70–99)
HCT VFR BLD CALC: 41.6 % — SIGNIFICANT CHANGE UP (ref 37–47)
HGB BLD-MCNC: 13.9 G/DL — SIGNIFICANT CHANGE UP (ref 12–16)
IMM GRANULOCYTES NFR BLD AUTO: 0.4 % — HIGH (ref 0.1–0.3)
LYMPHOCYTES # BLD AUTO: 1.45 K/UL — SIGNIFICANT CHANGE UP (ref 1.2–3.4)
LYMPHOCYTES # BLD AUTO: 28 % — SIGNIFICANT CHANGE UP (ref 20.5–51.1)
MCHC RBC-ENTMCNC: 30.9 PG — SIGNIFICANT CHANGE UP (ref 27–31)
MCHC RBC-ENTMCNC: 33.4 G/DL — SIGNIFICANT CHANGE UP (ref 32–37)
MCV RBC AUTO: 92.4 FL — SIGNIFICANT CHANGE UP (ref 81–99)
MONOCYTES # BLD AUTO: 0.54 K/UL — SIGNIFICANT CHANGE UP (ref 0.1–0.6)
MONOCYTES NFR BLD AUTO: 10.4 % — HIGH (ref 1.7–9.3)
NEUTROPHILS # BLD AUTO: 2.93 K/UL — SIGNIFICANT CHANGE UP (ref 1.4–6.5)
NEUTROPHILS NFR BLD AUTO: 56.7 % — SIGNIFICANT CHANGE UP (ref 42.2–75.2)
NRBC BLD AUTO-RTO: 0 /100 WBCS — SIGNIFICANT CHANGE UP (ref 0–0)
PLATELET # BLD AUTO: 310 K/UL — SIGNIFICANT CHANGE UP (ref 130–400)
PMV BLD: 9.8 FL — SIGNIFICANT CHANGE UP (ref 7.4–10.4)
POTASSIUM SERPL-MCNC: 4.6 MMOL/L — SIGNIFICANT CHANGE UP (ref 3.5–5)
POTASSIUM SERPL-SCNC: 4.6 MMOL/L — SIGNIFICANT CHANGE UP (ref 3.5–5)
RBC # BLD: 4.5 M/UL — SIGNIFICANT CHANGE UP (ref 4.2–5.4)
RBC # FLD: 12.6 % — SIGNIFICANT CHANGE UP (ref 11.5–14.5)
SODIUM SERPL-SCNC: 140 MMOL/L — SIGNIFICANT CHANGE UP (ref 135–146)
WBC # BLD: 5.17 K/UL — SIGNIFICANT CHANGE UP (ref 4.8–10.8)
WBC # FLD AUTO: 5.17 K/UL — SIGNIFICANT CHANGE UP (ref 4.8–10.8)

## 2025-04-29 PROCEDURE — 99233 SBSQ HOSP IP/OBS HIGH 50: CPT

## 2025-04-29 PROCEDURE — 74177 CT ABD & PELVIS W/CONTRAST: CPT | Mod: 26

## 2025-04-29 RX ORDER — SODIUM CHLORIDE 9 G/1000ML
1000 INJECTION, SOLUTION INTRAVENOUS
Refills: 0 | Status: DISCONTINUED | OUTPATIENT
Start: 2025-04-29 | End: 2025-05-01

## 2025-04-29 RX ORDER — IOHEXOL 350 MG/ML
30 INJECTION, SOLUTION INTRAVENOUS ONCE
Refills: 0 | Status: COMPLETED | OUTPATIENT
Start: 2025-04-29 | End: 2025-04-29

## 2025-04-29 RX ADMIN — Medication 100 MILLIGRAM(S): at 05:39

## 2025-04-29 RX ADMIN — Medication 100 MILLIGRAM(S): at 21:25

## 2025-04-29 RX ADMIN — Medication 200 MILLIGRAM(S): at 17:25

## 2025-04-29 RX ADMIN — DULOXETINE 60 MILLIGRAM(S): 20 CAPSULE, DELAYED RELEASE ORAL at 11:49

## 2025-04-29 RX ADMIN — SODIUM CHLORIDE 50 MILLILITER(S): 9 INJECTION, SOLUTION INTRAVENOUS at 18:06

## 2025-04-29 RX ADMIN — Medication 1 APPLICATION(S): at 05:39

## 2025-04-29 RX ADMIN — METOPROLOL SUCCINATE 50 MILLIGRAM(S): 50 TABLET, EXTENDED RELEASE ORAL at 05:36

## 2025-04-29 RX ADMIN — Medication 40 MILLIGRAM(S): at 06:30

## 2025-04-29 RX ADMIN — IOHEXOL 30 MILLILITER(S): 350 INJECTION, SOLUTION INTRAVENOUS at 18:05

## 2025-04-29 RX ADMIN — Medication 100 MILLIGRAM(S): at 13:20

## 2025-04-29 RX ADMIN — Medication 200 MILLIGRAM(S): at 05:39

## 2025-04-29 NOTE — PHYSICAL THERAPY INITIAL EVALUATION ADULT - ADDITIONAL COMMENTS
pt [place] alone [place] a [place] with 6-7 steps to enter and one level inside.  Pt was independent community ambulator prior to admission without AD.  Pt was an active  prior to admission

## 2025-04-29 NOTE — PHYSICAL THERAPY INITIAL EVALUATION ADULT - PERTINENT HX OF CURRENT PROBLEM, REHAB EVAL
89 yo F with hx of HTN, HLD, Diverticulitis, Depression and GERD presents to ED for abdominal pain.     Left lower abdominal pain likely 2/2 acute sigmoid diverticulitis   - CT abdomen: Findings compatible with sigmoid diverticulitis/colitis.

## 2025-04-29 NOTE — PHYSICAL THERAPY INITIAL EVALUATION ADULT - GENERAL OBSERVATIONS, REHAB EVAL
10;00-10;23 pt was seen for PT IE at bed side, pt is agreeable, chart thoroughly reviewed, RN Celeste is aware.  Pt was received semi gallegos in bed, in no apparent distress, +hep lock, +call bell within reach, bed side table at reach

## 2025-04-29 NOTE — PROGRESS NOTE ADULT - SUBJECTIVE AND OBJECTIVE BOX
SUBJECTIVE:    Patient is a 89y old Female who presents with a chief complaint of Abdominal pain (28 Apr 2025 13:23)    Currently admitted to medicine with the primary diagnosis of Diverticulitis       Today is hospital day 3d. This morning she is resting comfortably in bed and reports no new issues or overnight events. still reports abdominal LLQ discomfort radiating to SPC ? ?          PAST MEDICAL & SURGICAL HISTORY  High blood pressure    GERD (gastroesophageal reflux disease)    Neuropathy      SOCIAL HISTORY:    ALLERGIES:  No Known Allergies    MEDICATIONS:  STANDING MEDICATIONS  amLODIPine   Tablet 5 milliGRAM(s) Oral daily  chlorhexidine 2% Cloths 1 Application(s) Topical <User Schedule>  ciprofloxacin   IVPB 400 milliGRAM(s) IV Intermittent every 12 hours  DULoxetine 60 milliGRAM(s) Oral daily  enoxaparin Injectable 40 milliGRAM(s) SubCutaneous every 24 hours  iohexol 300 mG (iodine)/mL Oral Solution 30 milliLiter(s) Oral once  lactated ringers. 1000 milliLiter(s) IV Continuous <Continuous>  metoprolol succinate ER 50 milliGRAM(s) Oral daily  metroNIDAZOLE  IVPB      metroNIDAZOLE  IVPB 500 milliGRAM(s) IV Intermittent every 8 hours  pantoprazole    Tablet 40 milliGRAM(s) Oral before breakfast  senna 2 Tablet(s) Oral at bedtime    PRN MEDICATIONS  acetaminophen     Tablet .. 650 milliGRAM(s) Oral every 6 hours PRN  melatonin 3 milliGRAM(s) Oral at bedtime PRN    VITALS:   T(F): 98.3  HR: 62  BP: 114/56  RR: 18  SpO2: 99%    LABS:  Negative for smoking/alcohol/drug use.                         13.9   5.17  )-----------( 310      ( 29 Apr 2025 07:59 )             41.6     04-29    140  |  105  |  8[L]  ----------------------------<  111[H]  4.6   |  28  |  1.1    Ca    9.1      29 Apr 2025 07:59        Urinalysis Basic - ( 29 Apr 2025 07:59 )    Color: x / Appearance: x / SG: x / pH: x  Gluc: 111 mg/dL / Ketone: x  / Bili: x / Urobili: x   Blood: x / Protein: x / Nitrite: x   Leuk Esterase: x / RBC: x / WBC x   Sq Epi: x / Non Sq Epi: x / Bacteria: x            Culture - Blood (collected 26 Apr 2025 21:50)  Source: Blood Blood  Preliminary Report (28 Apr 2025 22:02):    No growth at 24 hours    Culture - Blood (collected 26 Apr 2025 21:50)  Source: Blood Blood  Preliminary Report (28 Apr 2025 22:01):    No growth at 24 hours          RADIOLOGY:    PHYSICAL EXAM:  GEN: No acute distress  HEENT: normocephalic, atraumatic, aniceteric  LUNGS: Clear to auscultation bilaterally, no rales/wheezing/ rhonchi  HEART: S1/S2 present. RRR, no murmurs  ABD: Soft, LLQ and SPC tenderness , non-distended. Bowel sounds present  EXT: warm   NEURO: AAOX3, normal affect      ASSESSMENT AND PLAN:    88 yo F with hx of HTN, HLD, Diverticulitis, Depression and GERD presents to ED for abdominal pain started 1 day ago. Patient reports that she started feeling abdominal pain yesterday, located at left lower quadrant area. Patient was seen in ED this morning, found to have acute diverticulitis and was sent home on PO abx (cipro and flagyl) which she took 1 dose of cipro and 2 doses of flagyl without improvement in pain, prompting her to come back to ED again. Also reports nausea. Denied fever at home. However patient had fever of 100.5 in ED. 88 yo F with hx of HTN, HLD, Diverticulitis, Depression and GERD presents to ED for abdominal pain started 1 day ago. Patient reports that she started feeling abdominal pain yesterday, located at left lower quadrant area. Patient was seen in ED this morning, found to have acute diverticulitis and was sent home on PO abx (cipro and flagyl) which she took 1 dose of cipro and 2 doses of flagyl without improvement in pain, prompting her to come back to ED again. Also reports nausea. Denied fever at home. However patient had fever of 100.5 in ED.     # Acute diverticulitis     # Acute sigmoid diverticulitis   - CT abdomen:  Mild perisigmoid inflammatory changes related to a diverticulum compatible with   diverticulitis. Associated mild circumferential wall thickening is noted   as well.  - monitor wbc count   - blood clx negative   - Cipro/ Flagyl ; check QTc level 425  - De-escalate diet - advance as tolerated  - repeat CTAP due to not improving symptoms   - Outpatient GI for colonoscopy 4-6 weeks after acute diverticulitis resolved     # Reported suprapubic discomfort and increased urinary frequency on 4/28   - bladder scan neg 0 cc pvr   - u/a negative   - having bowel movements     # H/O HTN - on amlodipine 5 mg qd at home     dvt/ gi ppx/diet  dispo: acute (skilled pt needs)  family discussion: spoke to daughter Tri Ayala on 4/29 - all questions answered , agreeing with plan   handoff:  [] pt symptoms initially improving on day 1 now worse and has new pain (reports LLQ and across entire lower abdomen) - fu repeat ct ap

## 2025-04-30 LAB
ALBUMIN SERPL ELPH-MCNC: 3.9 G/DL — SIGNIFICANT CHANGE UP (ref 3.5–5.2)
ALP SERPL-CCNC: 52 U/L — SIGNIFICANT CHANGE UP (ref 30–115)
ALT FLD-CCNC: 15 U/L — SIGNIFICANT CHANGE UP (ref 0–41)
ANION GAP SERPL CALC-SCNC: 13 MMOL/L — SIGNIFICANT CHANGE UP (ref 7–14)
AST SERPL-CCNC: 23 U/L — SIGNIFICANT CHANGE UP (ref 0–41)
BASOPHILS # BLD AUTO: 0.04 K/UL — SIGNIFICANT CHANGE UP (ref 0–0.2)
BASOPHILS NFR BLD AUTO: 0.8 % — SIGNIFICANT CHANGE UP (ref 0–1)
BILIRUB SERPL-MCNC: 0.4 MG/DL — SIGNIFICANT CHANGE UP (ref 0.2–1.2)
BUN SERPL-MCNC: 8 MG/DL — LOW (ref 10–20)
CALCIUM SERPL-MCNC: 8.9 MG/DL — SIGNIFICANT CHANGE UP (ref 8.4–10.5)
CHLORIDE SERPL-SCNC: 102 MMOL/L — SIGNIFICANT CHANGE UP (ref 98–110)
CO2 SERPL-SCNC: 26 MMOL/L — SIGNIFICANT CHANGE UP (ref 17–32)
CREAT SERPL-MCNC: 1.2 MG/DL — SIGNIFICANT CHANGE UP (ref 0.7–1.5)
EGFR: 44 ML/MIN/1.73M2 — LOW
EGFR: 44 ML/MIN/1.73M2 — LOW
EOSINOPHIL # BLD AUTO: 0.22 K/UL — SIGNIFICANT CHANGE UP (ref 0–0.7)
EOSINOPHIL NFR BLD AUTO: 4.2 % — SIGNIFICANT CHANGE UP (ref 0–8)
GLUCOSE BLDC GLUCOMTR-MCNC: 121 MG/DL — HIGH (ref 70–99)
GLUCOSE SERPL-MCNC: 110 MG/DL — HIGH (ref 70–99)
HCT VFR BLD CALC: 43.3 % — SIGNIFICANT CHANGE UP (ref 37–47)
HGB BLD-MCNC: 14.4 G/DL — SIGNIFICANT CHANGE UP (ref 12–16)
IMM GRANULOCYTES NFR BLD AUTO: 0.6 % — HIGH (ref 0.1–0.3)
LYMPHOCYTES # BLD AUTO: 1.22 K/UL — SIGNIFICANT CHANGE UP (ref 1.2–3.4)
LYMPHOCYTES # BLD AUTO: 23.6 % — SIGNIFICANT CHANGE UP (ref 20.5–51.1)
MCHC RBC-ENTMCNC: 30.7 PG — SIGNIFICANT CHANGE UP (ref 27–31)
MCHC RBC-ENTMCNC: 33.3 G/DL — SIGNIFICANT CHANGE UP (ref 32–37)
MCV RBC AUTO: 92.3 FL — SIGNIFICANT CHANGE UP (ref 81–99)
MONOCYTES # BLD AUTO: 0.52 K/UL — SIGNIFICANT CHANGE UP (ref 0.1–0.6)
MONOCYTES NFR BLD AUTO: 10 % — HIGH (ref 1.7–9.3)
NEUTROPHILS # BLD AUTO: 3.15 K/UL — SIGNIFICANT CHANGE UP (ref 1.4–6.5)
NEUTROPHILS NFR BLD AUTO: 60.8 % — SIGNIFICANT CHANGE UP (ref 42.2–75.2)
NRBC BLD AUTO-RTO: 0 /100 WBCS — SIGNIFICANT CHANGE UP (ref 0–0)
PLATELET # BLD AUTO: 340 K/UL — SIGNIFICANT CHANGE UP (ref 130–400)
PMV BLD: 10.1 FL — SIGNIFICANT CHANGE UP (ref 7.4–10.4)
POTASSIUM SERPL-MCNC: 4.3 MMOL/L — SIGNIFICANT CHANGE UP (ref 3.5–5)
POTASSIUM SERPL-SCNC: 4.3 MMOL/L — SIGNIFICANT CHANGE UP (ref 3.5–5)
PROT SERPL-MCNC: 6.3 G/DL — SIGNIFICANT CHANGE UP (ref 6–8)
RBC # BLD: 4.69 M/UL — SIGNIFICANT CHANGE UP (ref 4.2–5.4)
RBC # FLD: 12.7 % — SIGNIFICANT CHANGE UP (ref 11.5–14.5)
SODIUM SERPL-SCNC: 141 MMOL/L — SIGNIFICANT CHANGE UP (ref 135–146)
WBC # BLD: 5.18 K/UL — SIGNIFICANT CHANGE UP (ref 4.8–10.8)
WBC # FLD AUTO: 5.18 K/UL — SIGNIFICANT CHANGE UP (ref 4.8–10.8)

## 2025-04-30 PROCEDURE — 99232 SBSQ HOSP IP/OBS MODERATE 35: CPT

## 2025-04-30 PROCEDURE — 99222 1ST HOSP IP/OBS MODERATE 55: CPT

## 2025-04-30 RX ORDER — DIPHENHYDRAMINE HCL 12.5MG/5ML
25 ELIXIR ORAL EVERY 4 HOURS
Refills: 0 | Status: DISCONTINUED | OUTPATIENT
Start: 2025-04-30 | End: 2025-05-01

## 2025-04-30 RX ADMIN — Medication 200 MILLIGRAM(S): at 18:24

## 2025-04-30 RX ADMIN — SODIUM CHLORIDE 50 MILLILITER(S): 9 INJECTION, SOLUTION INTRAVENOUS at 21:09

## 2025-04-30 RX ADMIN — Medication 100 MILLIGRAM(S): at 13:10

## 2025-04-30 RX ADMIN — Medication 1 APPLICATION(S): at 05:34

## 2025-04-30 RX ADMIN — Medication 3 MILLIGRAM(S): at 23:40

## 2025-04-30 RX ADMIN — Medication 200 MILLIGRAM(S): at 05:34

## 2025-04-30 RX ADMIN — Medication 3 MILLIGRAM(S): at 01:43

## 2025-04-30 RX ADMIN — Medication 40 MILLIGRAM(S): at 05:34

## 2025-04-30 RX ADMIN — AMLODIPINE BESYLATE 5 MILLIGRAM(S): 10 TABLET ORAL at 05:34

## 2025-04-30 RX ADMIN — Medication 100 MILLIGRAM(S): at 05:34

## 2025-04-30 RX ADMIN — Medication 100 MILLIGRAM(S): at 21:09

## 2025-04-30 RX ADMIN — METOPROLOL SUCCINATE 50 MILLIGRAM(S): 50 TABLET, EXTENDED RELEASE ORAL at 05:34

## 2025-04-30 RX ADMIN — DULOXETINE 60 MILLIGRAM(S): 20 CAPSULE, DELAYED RELEASE ORAL at 12:14

## 2025-04-30 NOTE — CONSULT NOTE ADULT - ASSESSMENT
88 yo F with hx of HTN, HLD, Diverticulitis, Depression and GERD who presented w/ LLQ abd pain, being evaluated for acute uncomplicated diverticulitis.     #acute uncomplicated diverticulitis 89 yo F with hx of HTN, HLD, Diverticulitis, Depression and GERD who presented w/ LLQ abd pain, being evaluated for acute uncomplicated diverticulitis.     #acute uncomplicated diverticulitis      #hepatic steatosis  normal LFTs  -no intervention in light of pt's age 88 yo F with hx of HTN, HLD, Diverticulitis, Depression and GERD w/ stricture s/p dilation 10 y ago w/ Dr. Reyes, breast ca s/p lumpectomy and chemotherapy in remission since 2008, who presented w/ LLQ abd pain, being evaluated for acute uncomplicated diverticulitis.     #acute uncomplicated diverticulitis  5th episode in her lifetime with the last episodes occurring >10-15y ago mostly after chemotherapy for breast ca    -continue w/ current regimen on IV antibiotics  -diet as tolerated  -colonoscopy in 4-6 weeks to r/o underlying pathology artie given fam hx of colon  -pt advised to f/u w/ primary GI after d/c   -bld cx negative so far, f/u final results  -supportive care per primary team  -call back prn    #hepatic steatosis  normal LFTs  -no intervention in light of pt's age      Time-based billing (NON-critical care).   55 minutes spent on total encounter which excludes teaching time and/or separately reported services; more than 50% of the visit was spent counseling and / or coordinating care by the attending physician.  The necessity of the time spent during the encounter on this date of service was due to: Coordination of care.

## 2025-04-30 NOTE — PROGRESS NOTE ADULT - SUBJECTIVE AND OBJECTIVE BOX
88 yo F with hx of HTN, HLD, Diverticulitis, Depression and GERD presents to ED for abdominal pain started 1 day ago. Patient reports that she started feeling abdominal pain yesterday, located at left lower quadrant area.    Today:  Seen at bedside, no new complaints.              REVIEW OF SYSTEMS:  No new complaints      MEDICATIONS  (STANDING):  amLODIPine   Tablet 5 milliGRAM(s) Oral daily  chlorhexidine 2% Cloths 1 Application(s) Topical <User Schedule>  ciprofloxacin   IVPB 400 milliGRAM(s) IV Intermittent every 12 hours  DULoxetine 60 milliGRAM(s) Oral daily  enoxaparin Injectable 40 milliGRAM(s) SubCutaneous every 24 hours  lactated ringers. 1000 milliLiter(s) (50 mL/Hr) IV Continuous <Continuous>  metoprolol succinate ER 50 milliGRAM(s) Oral daily  metroNIDAZOLE  IVPB      metroNIDAZOLE  IVPB 500 milliGRAM(s) IV Intermittent every 8 hours  pantoprazole    Tablet 40 milliGRAM(s) Oral before breakfast  senna 2 Tablet(s) Oral at bedtime    MEDICATIONS  (PRN):  acetaminophen     Tablet .. 650 milliGRAM(s) Oral every 6 hours PRN Temp greater or equal to 38C (100.4F), Mild Pain (1 - 3)  melatonin 3 milliGRAM(s) Oral at bedtime PRN Insomnia      Allergies  No Known Allergies          Vital Signs Last 24 Hrs  T(C): 36.3 (30 Apr 2025 12:58), Max: 36.7 (29 Apr 2025 22:04)  T(F): 97.4 (30 Apr 2025 12:58), Max: 98 (29 Apr 2025 22:04)  HR: 57 (30 Apr 2025 12:58) (53 - 61)  BP: 158/75 (30 Apr 2025 12:58) (124/69 - 163/76)  RR: 18 (30 Apr 2025 12:58) (18 - 18)  SpO2: 97% (30 Apr 2025 12:58) (95% - 97%)    Parameters below as of 30 Apr 2025 12:58  Patient On (Oxygen Delivery Method): room air        PHYSICAL EXAM:  GEN: No acute distress  HEENT: normocephalic, atraumatic, aniceteric  LUNGS: Clear to auscultation bilaterally, no rales/wheezing/ rhonchi  HEART: S1/S2 present. RRR, no murmurs  ABD: Soft, LLQ and SPC tenderness , non-distended. Bowel sounds present  EXT: warm   NEURO: AAOX3, normal affect      LABS:                        14.4   5.18  )-----------( 340      ( 30 Apr 2025 08:09 )             43.3     04-30    141  |  102  |  8[L]  ----------------------------<  110[H]  4.3   |  26  |  1.2    Ca    8.9      30 Apr 2025 08:09    TPro  6.3  /  Alb  3.9  /  TBili  0.4  /  DBili  x   /  AST  23  /  ALT  15  /  AlkPhos  52  04-30      Urinalysis Basic - ( 30 Apr 2025 08:09 )    Color: x / Appearance: x / SG: x / pH: x  Gluc: 110 mg/dL / Ketone: x  / Bili: x / Urobili: x   Blood: x / Protein: x / Nitrite: x   Leuk Esterase: x / RBC: x / WBC x   Sq Epi: x / Non Sq Epi: x / Bacteria: x

## 2025-04-30 NOTE — PROGRESS NOTE ADULT - ASSESSMENT
88 yo F with hx of HTN, HLD, Diverticulitis, Depression and GERD presents to ED for abdominal pain started 1 day ago. Patient reports that she started feeling abdominal pain yesterday, located at left lower quadrant area. Patient was seen in ED this morning, found to have acute diverticulitis and was sent home on PO abx (cipro and flagyl) which she took 1 dose of cipro and 2 doses of flagyl without improvement in pain, prompting her to come back to ED again. Also reports nausea. Denied fever at home. However patient had fever of 100.5 in ED. 88 yo F with hx of HTN, HLD, Diverticulitis, Depression and GERD presents to ED for abdominal pain started 1 day ago. Patient reports that she started feeling abdominal pain yesterday, located at left lower quadrant area. Patient was seen in ED this morning, found to have acute diverticulitis and was sent home on PO abx (cipro and flagyl) which she took 1 dose of cipro and 2 doses of flagyl without improvement in pain, prompting her to come back to ED again. Also reports nausea. Denied fever at home. However patient had fever of 100.5 in ED.     # Acute diverticulitis     # Acute sigmoid diverticulitis   - CT abdomen:  Mild perisigmoid inflammatory changes related to a diverticulum compatible with   diverticulitis. Associated mild circumferential wall thickening is noted   as well.  - monitor wbc count   - blood clx negative   - Cipro/ Flagyl ; check QTc level 425  - Advanced diet today  - repeat CTAP unchanged  - Outpatient GI for colonoscopy 4-6 weeks after acute diverticulitis resolved     # Reported suprapubic discomfort and increased urinary frequency on 4/28   - bladder scan neg 0 cc pvr   - u/a negative   - having bowel movements     # H/O HTN - on amlodipine 5 mg qd at home     dvt/ gi ppx/diet  dispo: acute, possible DC tomorrow

## 2025-04-30 NOTE — CONSULT NOTE ADULT - SUBJECTIVE AND OBJECTIVE BOX
88 yo F with hx of HTN, HLD, Diverticulitis, Depression and GERD who presented w/ LLQ abd pain, being evaluated for acute uncomplicated diverticulitis.     pt presented to ED for abdominal pain x 1 dd. pain was located at left lower quadrant area. Patient was seen in ED and found to have acute diverticulitis and was sent home on PO abx (cipro and flagyl) which she took 1 dose of cipro and 2 doses of flagyl without improvement in pain, prompting her to come back to ED again. Also reports nausea. Denied fever at home. However patient had fever of 100.5 in ED.       PAST MEDICAL & SURGICAL HISTORY:  High blood pressure  GERD (gastroesophageal reflux disease)  Neuropathy      FAMILY HISTORY:  non contributory    Social History:  neg x 3      Home Medications:  amLODIPine 5 mg oral tablet: 1 tab(s) orally once a day (26 Apr 2025 22:18)  aspirin 81 mg oral tablet, chewable: 1 tab(s) orally once a day (18 Apr 2022 12:48)  Cymbalta 60 mg oral delayed release capsule: 1 cap(s) orally once a day (17 Apr 2022 10:31)  metoprolol succinate 50 mg oral tablet, extended release: 1 tab(s) orally once a day (17 Apr 2022 10:31)  PriLOSEC 20 mg oral delayed release capsule: 1 cap(s) orally once a day (17 Apr 2022 10:31)    MEDICATIONS  (STANDING):  amLODIPine   Tablet 5 milliGRAM(s) Oral daily  chlorhexidine 2% Cloths 1 Application(s) Topical <User Schedule>  ciprofloxacin   IVPB 400 milliGRAM(s) IV Intermittent every 12 hours  DULoxetine 60 milliGRAM(s) Oral daily  enoxaparin Injectable 40 milliGRAM(s) SubCutaneous every 24 hours  lactated ringers. 1000 milliLiter(s) (50 mL/Hr) IV Continuous <Continuous>  metoprolol succinate ER 50 milliGRAM(s) Oral daily  metroNIDAZOLE  IVPB      metroNIDAZOLE  IVPB 500 milliGRAM(s) IV Intermittent every 8 hours  pantoprazole    Tablet 40 milliGRAM(s) Oral before breakfast  senna 2 Tablet(s) Oral at bedtime    MEDICATIONS  (PRN):  acetaminophen     Tablet .. 650 milliGRAM(s) Oral every 6 hours PRN Temp greater or equal to 38C (100.4F), Mild Pain (1 - 3)  melatonin 3 milliGRAM(s) Oral at bedtime PRN Insomnia      Allergies  No Known Allergies      Review of Systems:   Constitutional:  No Fever, No Chills  ENT/Mouth:  No Hearing Changes,  No Difficulty Swallowing  Eyes:  No Eye Pain, No Vision Changes  Cardiovascular:  No Chest Pain, No Palpitations  Respiratory:  No Cough, No Dyspnea  Gastrointestinal:  As described in HPI  Musculoskeletal:  No Joint Swelling, No Back Pain  Skin:  No Skin Lesions, No Jaundice  Neuro:  No Syncope, No Dizziness  Heme/Lymph:  No Bruising, No Bleeding.          Physical Examination:  T(C): 36.3 (04-30-25 @ 12:58), Max: 36.7 (04-29-25 @ 22:04)  HR: 57 (04-30-25 @ 12:58) (53 - 61)  BP: 158/75 (04-30-25 @ 12:58) (124/69 - 163/76)  RR: 18 (04-30-25 @ 12:58) (18 - 18)  SpO2: 97% (04-30-25 @ 12:58) (95% - 97%)        Constitutional: No acute distress.  Eyes:. Conjunctivae are clear, Sclera is non-icteric.  Ears Nose and Throat: The external ears are normal appearing,  Oral mucosa is pink and moist.  Respiratory:  No signs of respiratory distress. Lung sounds are clear bilaterally.  Cardiovascular:  S1 S2, Regular rate and rhythm.  GI: Abdomen is soft, symmetric. LLQ tenderness. no acute abdomen. + BS.   Neuro: No Tremor, No involuntary movements  Skin: No rashes, No Jaundice.          Data:                        14.4   5.18  )-----------( 340      ( 30 Apr 2025 08:09 )             43.3     Hgb Trend:  14.4  04-30-25 @ 08:09  13.9  04-29-25 @ 07:59  13.2  04-28-25 @ 06:06      04-30    141  |  102  |  8[L]  ----------------------------<  110[H]  4.3   |  26  |  1.2    Ca    8.9      30 Apr 2025 08:09    TPro  6.3  /  Alb  3.9  /  TBili  0.4  /  DBili  x   /  AST  23  /  ALT  15  /  AlkPhos  52  04-30    Liver panel trend:  TBili 0.4   /   AST 23   /   ALT 15   /   AlkP 52   /   Tptn 6.3   /   Alb 3.9    /   DBili --      04-30  TBili 0.8   /   AST 15   /   ALT 14   /   AlkP 60   /   Tptn 6.3   /   Alb 4.0    /   DBili --      04-27  TBili 0.6   /   AST 34   /   ALT 18   /   AlkP 58   /   Tptn 6.5   /   Alb 3.9    /   DBili --      04-26  TBili 0.4   /   AST 18   /   ALT 19   /   AlkP 67   /   Tptn 6.8   /   Alb 4.2    /   DBili --      04-26              Radiology:  CT Abdomen and Pelvis w/ Oral Cont and w/ IV Cont:   ACC: [id number] EXAM:  CT ABDOMEN AND PELVIS OC IC   ORDERED BY: DOUGIE QUEVEDO     PROCEDURE DATE:  04/29/2025          INTERPRETATION:  CLINICAL HISTORY: diverticulitis not improving , now   radiaring to SPC / across lower abdomen.    TECHNIQUE: Contiguous axial CT images were obtained from the lower chest   to the pubic symphysis following administration of intravenous contrast.    97 cc administered of Omnipaque 350 (3 cc discarded).  Oral contrast was   administered.  Reformatted images inthe coronal and sagittal planes were   acquired.    COMPARISON: CT dated 4/26/2025      FINDINGS/  IMPRESSION:    Ongoing sigmoid diverticulitis, appearance without significant change   from recent CT of 4/26/2025. No free air or fluid collection.    Remainder of findings are without significant change on the short-term   follow-up exam. See prior CT report.    --- End of Report ---    PAULINO RICHARDSON MD; Attending Radiologist  This document has been electronically signed. Apr 30 2025 10:19AM (04-29-25 @ 20:37)        < from: CT Abdomen and Pelvis w/ IV Cont (04.26.25 @ 07:36) >  ACC: [id number] EXAM:  CT ABDOMEN AND PELVIS IC   ORDERED BY: VANITA MASSEY     PROCEDURE DATE:  04/26/2025          INTERPRETATION:  CLINICAL INFORMATION: Abdominal pain    COMPARISON: 7/9/2008    CONTRAST/COMPLICATIONS:  IV Contrast: Omnipaque 350  90 cc administered   10 cc discarded  Oral Contrast: NONE  .    PROCEDURE:  CT of the Abdomen and Pelvis was performed.  Sagittal and coronal reformats were performed.    FINDINGS:  LOWER CHEST: Bilateral dependent atelectatic changes..    LIVER:Steatosis.  BILE DUCTS: Normal caliber.  GALLBLADDER: Within normal limits.  SPLEEN: Within normal limits.  PANCREAS: Mild parenchymal volume loss..  ADRENALS: Within normal limits.  KIDNEYS/URETERS: Symmetric pattern of renal enhancement. Bilateral too   small to further characterize hypodensities..    BLADDER: Minimally distended.  REPRODUCTIVE ORGANS: Unremarkable at CT.    BOWEL: No bowel obstruction. Appendix is normal. Mild perisigmoid   inflammatory changes related to a diverticulum compatible with   diverticulitis. Associated mild circumferential wall thickening is noted   as well.  PERITONEUM/RETROPERITONEUM: Within normal limits.  VESSELS: Within normal limits.  LYMPH NODES: No lymphadenopathy.  ABDOMINAL WALL: Small fat-containing umbilical hernia.  BONES: Osteopenia and degenerative changes.. L1 hemangioma.      IMPRESSION:      Findings compatible with sigmoid diverticulitis/colitis. Further   evaluation with colonoscopy may be of benefit when patient is clinically   able.      --- End of Report ---          TRISTON ANDRADE MD; Attending Radiologist  This document has been electronically signed. 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 Reyes, breast ca s/p lumpectomy and chemotherapy in remission since 2008, who presented w/ LLQ abd pain, being evaluated for acute uncomplicated diverticulitis.     pt presented to ED for abdominal pain x 1 dd. pain was located at left lower quadrant area. Patient was seen in ED and found to have acute diverticulitis and was sent home on PO abx (cipro and flagyl) which she took 1 dose of cipro and 2 doses of flagyl without improvement in pain, prompting her to come back to ED again. Also reports nausea. Denied fever at home. However patient had fever of 100.5 in ED.     today, pt feels improvement and reports significantly less pain in the LLQ. she is passing gas and BMs.   she had no fevers or chills.   no tenesmus, no change in stool caliber, no hematochezia, no melena, no coffee ground emesis or hematemesis.  ROS otherwise negative.    this is her 5th episode of diverticulitis and all her prior episodes occurred > 10-15 y ago.   last colonoscopy 7-8 y ago.       PAST MEDICAL & SURGICAL HISTORY:  High blood pressure  GERD (gastroesophageal reflux disease)  Neuropathy      FAMILY HISTORY:  father and sister w/ hx of colon ca at age > 60    Social History:  neg x 3      Home Medications:  amLODIPine 5 mg oral tablet: 1 tab(s) orally once a day (26 Apr 2025 22:18)  aspirin 81 mg oral tablet, chewable: 1 tab(s) orally once a day (18 Apr 2022 12:48)  Cymbalta 60 mg oral delayed release capsule: 1 cap(s) orally once a day (17 Apr 2022 10:31)  metoprolol succinate 50 mg oral tablet, extended release: 1 tab(s) orally once a day (17 Apr 2022 10:31)  PriLOSEC 20 mg oral delayed release capsule: 1 cap(s) orally once a day (17 Apr 2022 10:31)    MEDICATIONS  (STANDING):  amLODIPine   Tablet 5 milliGRAM(s) Oral daily  chlorhexidine 2% Cloths 1 Application(s) Topical <User Schedule>  ciprofloxacin   IVPB 400 milliGRAM(s) IV Intermittent every 12 hours  DULoxetine 60 milliGRAM(s) Oral daily  enoxaparin Injectable 40 milliGRAM(s) SubCutaneous every 24 hours  lactated ringers. 1000 milliLiter(s) (50 mL/Hr) IV Continuous <Continuous>  metoprolol succinate ER 50 milliGRAM(s) Oral daily  metroNIDAZOLE  IVPB      metroNIDAZOLE  IVPB 500 milliGRAM(s) IV Intermittent every 8 hours  pantoprazole    Tablet 40 milliGRAM(s) Oral before breakfast  senna 2 Tablet(s) Oral at bedtime    MEDICATIONS  (PRN):  acetaminophen     Tablet .. 650 milliGRAM(s) Oral every 6 hours PRN Temp greater or equal to 38C (100.4F), Mild Pain (1 - 3)  melatonin 3 milliGRAM(s) Oral at bedtime PRN Insomnia      Allergies  No Known Allergies      Review of Systems:   Constitutional:  No Fever, No Chills  ENT/Mouth:  No Hearing Changes,  No Difficulty Swallowing  Eyes:  No Eye Pain, No Vision Changes  Cardiovascular:  No Chest Pain, No Palpitations  Respiratory:  No Cough, No Dyspnea  Gastrointestinal:  As described in HPI  Musculoskeletal:  No Joint Swelling, No Back Pain  Skin:  No Skin Lesions, No Jaundice  Neuro:  No Syncope, No Dizziness  Heme/Lymph:  No Bruising, No Bleeding.          Physical Examination:  T(C): 36.3 (04-30-25 @ 12:58), Max: 36.7 (04-29-25 @ 22:04)  HR: 57 (04-30-25 @ 12:58) (53 - 61)  BP: 158/75 (04-30-25 @ 12:58) (124/69 - 163/76)  RR: 18 (04-30-25 @ 12:58) (18 - 18)  SpO2: 97% (04-30-25 @ 12:58) (95% - 97%)        Constitutional: No acute distress.  Eyes:. Conjunctivae are clear, Sclera is non-icteric.  Ears Nose and Throat: The external ears are normal appearing,  Oral mucosa is pink and moist.  Respiratory:  No signs of respiratory distress. Lung sounds are clear bilaterally.  Cardiovascular:  S1 S2, Regular rate and rhythm.  GI: Abdomen is soft, symmetric. LLQ tenderness. no acute abdomen. + BS.   Neuro: No Tremor, No involuntary movements  Skin: No rashes, No Jaundice.  AAOx3        Data:                        14.4   5.18  )-----------( 340      ( 30 Apr 2025 08:09 )             43.3     Hgb Trend:  14.4  04-30-25 @ 08:09  13.9  04-29-25 @ 07:59  13.2  04-28-25 @ 06:06      04-30    141  |  102  |  8[L]  ----------------------------<  110[H]  4.3   |  26  |  1.2    Ca    8.9      30 Apr 2025 08:09    TPro  6.3  /  Alb  3.9  /  TBili  0.4  /  DBili  x   /  AST  23  /  ALT  15  /  AlkPhos  52  04-30    Liver panel trend:  TBili 0.4   /   AST 23   /   ALT 15   /   AlkP 52   /   Tptn 6.3   /   Alb 3.9    /   DBili --      04-30  TBili 0.8   /   AST 15   /   ALT 14   /   AlkP 60   /   Tptn 6.3   /   Alb 4.0    /   DBili --      04-27  TBili 0.6   /   AST 34   /   ALT 18   /   AlkP 58   /   Tptn 6.5   /   Alb 3.9    /   DBili --      04-26  TBili 0.4   /   AST 18   /   ALT 19   /   AlkP 67   /   Tptn 6.8   /   Alb 4.2    /   DBili --      04-26              Radiology:  CT Abdomen and Pelvis w/ Oral Cont and w/ IV Cont:   ACC: [id number] EXAM:  CT ABDOMEN AND PELVIS OC IC   ORDERED BY: DOUGIE QUEVEDO     PROCEDURE DATE:  04/29/2025          INTERPRETATION:  CLINICAL HISTORY: diverticulitis not improving , now   radiaring to SPC / across lower abdomen.    TECHNIQUE: Contiguous axial CT images were obtained from the lower chest   to the pubic symphysis following administration of intravenous contrast.    97 cc administered of Omnipaque 350 (3 cc discarded).  Oral contrast was   administered.  Reformatted images inthe coronal and sagittal planes were   acquired.    COMPARISON: CT dated 4/26/2025      FINDINGS/  IMPRESSION:    Ongoing sigmoid diverticulitis, appearance without significant change   from recent CT of 4/26/2025. No free air or fluid collection.    Remainder of findings are without significant change on the short-term   follow-up exam. See prior CT report.    --- End of Report ---    PAULINO RICHARDSON MD; Attending Radiologist  This document has been electronically signed. Apr 30 2025 10:19AM (04-29-25 @ 20:37)        < from: CT Abdomen and Pelvis w/ IV Cont (04.26.25 @ 07:36) >  ACC: [id number] EXAM:  CT ABDOMEN AND PELVIS IC   ORDERED BY: VANITA MASSEY     PROCEDURE DATE:  04/26/2025          INTERPRETATION:  CLINICAL INFORMATION: Abdominal pain    COMPARISON: 7/9/2008    CONTRAST/COMPLICATIONS:  IV Contrast: Omnipaque 350  90 cc administered   10 cc discarded  Oral Contrast: NONE  .    PROCEDURE:  CT of the Abdomen and Pelvis was performed.  Sagittal and coronal reformats were performed.    FINDINGS:  LOWER CHEST: Bilateral dependent atelectatic changes..    LIVER:Steatosis.  BILE DUCTS: Normal caliber.  GALLBLADDER: Within normal limits.  SPLEEN: Within normal limits.  PANCREAS: Mild parenchymal volume loss..  ADRENALS: Within normal limits.  KIDNEYS/URETERS: Symmetric pattern of renal enhancement. Bilateral too   small to further characterize hypodensities..    BLADDER: Minimally distended.  REPRODUCTIVE ORGANS: Unremarkable at CT.    BOWEL: No bowel obstruction. Appendix is normal. Mild perisigmoid   inflammatory changes related to a diverticulum compatible with   diverticulitis. Associated mild circumferential wall thickening is noted   as well.  PERITONEUM/RETROPERITONEUM: Within normal limits.  VESSELS: Within normal limits.  LYMPH NODES: No lymphadenopathy.  ABDOMINAL WALL: Small fat-containing umbilical hernia.  BONES: Osteopenia and degenerative changes.. L1 hemangioma.      IMPRESSION:      Findings compatible with sigmoid diverticulitis/colitis. Further   evaluation with colonoscopy may be of benefit when patient is clinically   able.      --- End of Report ---          TRISTON ANDRADE MD; Attending Radiologist  This document has been electronically signed. Apr 26 2025  8:17AM    < end of copied text >

## 2025-05-01 ENCOUNTER — TRANSCRIPTION ENCOUNTER (OUTPATIENT)
Age: 89
End: 2025-05-01

## 2025-05-01 VITALS — RESPIRATION RATE: 94 BRPM | HEART RATE: 74 BPM | SYSTOLIC BLOOD PRESSURE: 137 MMHG | DIASTOLIC BLOOD PRESSURE: 77 MMHG

## 2025-05-01 PROCEDURE — 99239 HOSP IP/OBS DSCHRG MGMT >30: CPT

## 2025-05-01 RX ORDER — METRONIDAZOLE 250 MG
500 TABLET ORAL EVERY 12 HOURS
Refills: 0 | Status: DISCONTINUED | OUTPATIENT
Start: 2025-05-01 | End: 2025-05-01

## 2025-05-01 RX ORDER — CIPROFLOXACIN HCL 250 MG
500 TABLET ORAL EVERY 12 HOURS
Refills: 0 | Status: DISCONTINUED | OUTPATIENT
Start: 2025-05-01 | End: 2025-05-01

## 2025-05-01 RX ORDER — CIPROFLOXACIN HCL 250 MG
1 TABLET ORAL
Qty: 10 | Refills: 0
Start: 2025-05-01 | End: 2025-05-05

## 2025-05-01 RX ORDER — METRONIDAZOLE 250 MG
1 TABLET ORAL
Qty: 10 | Refills: 0
Start: 2025-05-01 | End: 2025-05-05

## 2025-05-01 RX ADMIN — DULOXETINE 60 MILLIGRAM(S): 20 CAPSULE, DELAYED RELEASE ORAL at 11:08

## 2025-05-01 RX ADMIN — AMLODIPINE BESYLATE 5 MILLIGRAM(S): 10 TABLET ORAL at 06:06

## 2025-05-01 RX ADMIN — METOPROLOL SUCCINATE 50 MILLIGRAM(S): 50 TABLET, EXTENDED RELEASE ORAL at 06:05

## 2025-05-01 RX ADMIN — Medication 500 MILLIGRAM(S): at 11:08

## 2025-05-01 RX ADMIN — Medication 40 MILLIGRAM(S): at 06:06

## 2025-05-01 RX ADMIN — Medication 25 MILLIGRAM(S): at 12:42

## 2025-05-01 RX ADMIN — Medication 1 APPLICATION(S): at 06:10

## 2025-05-01 RX ADMIN — Medication 500 MILLIGRAM(S): at 11:10

## 2025-05-01 NOTE — DISCHARGE NOTE PROVIDER - HOSPITAL COURSE
89 yo F with hx of HTN, HLD, Diverticulitis, Depression and GERD presents to ED for abdominal pain started 1 day ago. Patient reports that she started feeling abdominal pain yesterday, located at left lower quadrant area. Patient was seen in ED this morning, found to have acute diverticulitis and was sent home on PO abx (cipro and flagyl) which she took 1 dose of cipro and 2 doses of flagyl without improvement in pain, prompting her to come back to ED again. Also reports nausea. Denied fever at home. However patient had fever of 100.5 in ED. 89 yo F with hx of HTN, HLD, Diverticulitis, Depression and GERD presents to ED for abdominal pain started 1 day ago. Patient reports that she started feeling abdominal pain yesterday, located at left lower quadrant area. Patient was seen in ED this morning, found to have acute diverticulitis and was sent home on PO abx (cipro and flagyl) which she took 1 dose of cipro and 2 doses of flagyl without improvement in pain, prompting her to come back to ED again. Also reports nausea. Denied fever at home. However patient had fever of 100.5 in ED.     # Acute diverticulitis     # Acute sigmoid diverticulitis   - CT abdomen:  Mild perisigmoid inflammatory changes related to a diverticulum compatible with   diverticulitis. Associated mild circumferential wall thickening is noted   as well.  - blood clx negative   - Cipro/ Flagyl PO on DC  - Advanced diet, tolerated well  - repeat CTAP unchanged  - Outpatient GI for colonoscopy 4-6 weeks after acute diverticulitis resolved     # Reported suprapubic discomfort and increased urinary frequency on 4/28   - bladder scan neg 0 cc pvr   - u/a negative   - having bowel movements     # H/O HTN - on amlodipine 5 mg qd at home    89 yo F with hx of HTN, HLD, Diverticulitis, Depression and GERD presents to ED for abdominal pain started 1 day ago. Patient reports that she started feeling abdominal pain yesterday, located at left lower quadrant area. Patient was seen in ED this morning, found to have acute diverticulitis and was sent home on PO abx (cipro and flagyl) which she took 1 dose of cipro and 2 doses of flagyl without improvement in pain, prompting her to come back to ED again. Also reports nausea. Denied fever at home. However patient had fever of 100.5 in ED. 89 yo F with hx of HTN, HLD, Diverticulitis, Depression and GERD presents to ED for abdominal pain started 1 day ago. Patient reports that she started feeling abdominal pain yesterday, located at left lower quadrant area. Patient was seen in ED this morning, found to have acute diverticulitis and was sent home on PO abx (cipro and flagyl) which she took 1 dose of cipro and 2 doses of flagyl without improvement in pain, prompting her to come back to ED again. Also reports nausea. Denied fever at home. However patient had fever of 100.5 in ED.     # Acute diverticulitis     # Acute sigmoid diverticulitis   - CT abdomen:  Mild perisigmoid inflammatory changes related to a diverticulum compatible with   diverticulitis. Associated mild circumferential wall thickening is noted   as well.  - blood clx negative   - Cipro/ (suspected flagyl allergy)  - Advanced diet, tolerated well  - repeat CTAP unchanged  - Outpatient GI for colonoscopy 4-6 weeks after acute diverticulitis resolved     # Reported suprapubic discomfort and increased urinary frequency on 4/28   - bladder scan neg 0 cc pvr   - u/a negative   - having bowel movements     # H/O HTN - on amlodipine 5 mg qd at home     Of note, patient was given Flagyl PO prior to DC and developed and drug rash to arms, back, chest.  Vitals stable, patient offered no complaints, Physical exam unremarkable.  Benadryl given.  Patient states she had a similar feeling when Flagyl was initially given 2 days ago but has had no reaction since then while getting IV flagyl.  Discussed with patient, advised to avoid flagyl in the future and to follow with her PMD.

## 2025-05-01 NOTE — DISCHARGE NOTE PROVIDER - NSDCMRMEDTOKEN_GEN_ALL_CORE_FT
amLODIPine 5 mg oral tablet: 1 tab(s) orally once a day  aspirin 81 mg oral tablet, chewable: 1 tab(s) orally once a day  Cipro 500 mg oral tablet: 1 tab(s) orally every 12 hours  Cymbalta 60 mg oral delayed release capsule: 1 cap(s) orally once a day  metoprolol succinate 50 mg oral tablet, extended release: 1 tab(s) orally once a day  metroNIDAZOLE 500 mg oral tablet: 1 tab(s) orally every 12 hours  PriLOSEC 20 mg oral delayed release capsule: 1 cap(s) orally once a day   amLODIPine 5 mg oral tablet: 1 tab(s) orally once a day  aspirin 81 mg oral tablet, chewable: 1 tab(s) orally once a day  Cipro 500 mg oral tablet: 1 tab(s) orally every 12 hours  Cymbalta 60 mg oral delayed release capsule: 1 cap(s) orally once a day  metoprolol succinate 50 mg oral tablet, extended release: 1 tab(s) orally once a day  PriLOSEC 20 mg oral delayed release capsule: 1 cap(s) orally once a day

## 2025-05-01 NOTE — DISCHARGE NOTE NURSING/CASE MANAGEMENT/SOCIAL WORK - PATIENT PORTAL LINK FT
You can access the FollowMyHealth Patient Portal offered by [facility] by registering at the following website: [email or web address]. By joining [facility]’s FollowMyHealth portal, you will also be able to view your health information using other applications (apps) compatible with our system.

## 2025-05-01 NOTE — DISCHARGE NOTE NURSING/CASE MANAGEMENT/SOCIAL WORK - FINANCIAL ASSISTANCE
[facility] provides services at a reduced cost to those who are determined to be eligible through [facility]’s financial assistance program. Information regarding [facility]’s financial assistance program can be found by going to [email or web address] or by calling [phone].

## 2025-05-01 NOTE — DISCHARGE NOTE NURSING/CASE MANAGEMENT/SOCIAL WORK - NSDCPEFALRISK_GEN_ALL_CORE
For information on Fall & Injury Prevention, visit: [email or web address] OR  [email or web address] OR  https://www.cdc.gov/steadi/patient.html

## 2025-05-01 NOTE — DISCHARGE NOTE PROVIDER - CARE PROVIDER_API CALL
Armando Tarango  Cardiology  [address] [place] [address]  Phone: [phone]  Fax: [phone]  Follow Up Time:

## 2025-05-01 NOTE — DISCHARGE NOTE PROVIDER - NSDCCPCAREPLAN_GEN_ALL_CORE_FT
PRINCIPAL DISCHARGE DIAGNOSIS  Diagnosis: Diverticulitis  Assessment and Plan of Treatment: Please complete your antibiotics as prescribed and follow with your primary care doctor within 1-2 weeks.  You should see your GI doctor for a Colonoscopy in 6 weeks.     PRINCIPAL DISCHARGE DIAGNOSIS  Diagnosis: Diverticulitis  Assessment and Plan of Treatment: Please complete your antibiotics as prescribed and follow with your primary care doctor within 1-2 weeks.  You should see your GI doctor for a Colonoscopy in 6 weeks.  Take ciprofloxacin.  Do not take flagyl/metronidazole.  You had a rash from Flagyl/metronidazole which could be a drug rash, hypersensitivity reaction.

## 2025-05-02 LAB
CULTURE RESULTS: SIGNIFICANT CHANGE UP
CULTURE RESULTS: SIGNIFICANT CHANGE UP
SPECIMEN SOURCE: SIGNIFICANT CHANGE UP
SPECIMEN SOURCE: SIGNIFICANT CHANGE UP

## 2025-09-06 ENCOUNTER — EMERGENCY (EMERGENCY)
Facility: HOSPITAL | Age: 89
LOS: 0 days | Discharge: HOME | End: 2025-09-06
Attending: EMERGENCY MEDICINE
Payer: MEDICARE

## 2025-09-06 ENCOUNTER — EMERGENCY (EMERGENCY)
Facility: HOSPITAL | Age: 89
LOS: 0 days | Discharge: HOME | End: 2025-09-06
Attending: STUDENT IN AN ORGANIZED HEALTH CARE EDUCATION/TRAINING PROGRAM
Payer: MEDICARE

## 2025-09-06 VITALS
RESPIRATION RATE: 18 BRPM | SYSTOLIC BLOOD PRESSURE: 125 MMHG | HEIGHT: 65 IN | WEIGHT: 153 LBS | OXYGEN SATURATION: 99 % | TEMPERATURE: 98 F | HEART RATE: 81 BPM | DIASTOLIC BLOOD PRESSURE: 59 MMHG

## 2025-09-06 VITALS
RESPIRATION RATE: 18 BRPM | DIASTOLIC BLOOD PRESSURE: 73 MMHG | TEMPERATURE: 98 F | OXYGEN SATURATION: 99 % | HEIGHT: 65 IN | SYSTOLIC BLOOD PRESSURE: 112 MMHG | HEART RATE: 58 BPM | WEIGHT: 153 LBS

## 2025-09-06 DIAGNOSIS — R42 DIZZINESS AND GIDDINESS: ICD-10-CM

## 2025-09-06 DIAGNOSIS — L29.9 PRURITUS, UNSPECIFIED: ICD-10-CM

## 2025-09-06 DIAGNOSIS — Z88.8 ALLERGY STATUS TO OTHER DRUGS, MEDICAMENTS AND BIOLOGICAL SUBSTANCES: ICD-10-CM

## 2025-09-06 DIAGNOSIS — T50.8X5A ADVERSE EFFECT OF DIAGNOSTIC AGENTS, INITIAL ENCOUNTER: ICD-10-CM

## 2025-09-06 LAB
ALBUMIN SERPL ELPH-MCNC: 4 G/DL — SIGNIFICANT CHANGE UP (ref 3.5–5.2)
ALP SERPL-CCNC: 51 U/L — SIGNIFICANT CHANGE UP (ref 30–115)
ALT FLD-CCNC: 19 U/L — SIGNIFICANT CHANGE UP (ref 0–41)
ANION GAP SERPL CALC-SCNC: 12 MMOL/L — SIGNIFICANT CHANGE UP (ref 7–14)
AST SERPL-CCNC: 32 U/L — SIGNIFICANT CHANGE UP (ref 0–41)
BASOPHILS # BLD AUTO: 0.04 K/UL — SIGNIFICANT CHANGE UP (ref 0–0.2)
BASOPHILS NFR BLD AUTO: 0.6 % — SIGNIFICANT CHANGE UP (ref 0–2)
BILIRUB SERPL-MCNC: 0.2 MG/DL — SIGNIFICANT CHANGE UP (ref 0.2–1.2)
BUN SERPL-MCNC: 21 MG/DL — HIGH (ref 10–20)
CALCIUM SERPL-MCNC: 9.1 MG/DL — SIGNIFICANT CHANGE UP (ref 8.4–10.5)
CHLORIDE SERPL-SCNC: 104 MMOL/L — SIGNIFICANT CHANGE UP (ref 98–110)
CO2 SERPL-SCNC: 26 MMOL/L — SIGNIFICANT CHANGE UP (ref 17–32)
CREAT SERPL-MCNC: 1.4 MG/DL — SIGNIFICANT CHANGE UP (ref 0.7–1.5)
EGFR: 36 ML/MIN/1.73M2 — LOW
EGFR: 36 ML/MIN/1.73M2 — LOW
EOSINOPHIL # BLD AUTO: 0.22 K/UL — SIGNIFICANT CHANGE UP (ref 0–0.5)
EOSINOPHIL NFR BLD AUTO: 3.1 % — SIGNIFICANT CHANGE UP (ref 0–6)
GLUCOSE SERPL-MCNC: 153 MG/DL — HIGH (ref 70–99)
HCT VFR BLD CALC: 40.6 % — SIGNIFICANT CHANGE UP (ref 34.5–45)
HGB BLD-MCNC: 13.4 G/DL — SIGNIFICANT CHANGE UP (ref 11.5–15.5)
IMM GRANULOCYTES # BLD AUTO: 0.01 K/UL — SIGNIFICANT CHANGE UP (ref 0–0.07)
IMM GRANULOCYTES NFR BLD AUTO: 0.1 % — SIGNIFICANT CHANGE UP (ref 0–0.9)
LYMPHOCYTES # BLD AUTO: 2.83 K/UL — SIGNIFICANT CHANGE UP (ref 1–3.3)
LYMPHOCYTES NFR BLD AUTO: 40.4 % — SIGNIFICANT CHANGE UP (ref 13–44)
MCHC RBC-ENTMCNC: 30.7 PG — SIGNIFICANT CHANGE UP (ref 27–34)
MCHC RBC-ENTMCNC: 33 G/DL — SIGNIFICANT CHANGE UP (ref 32–36)
MCV RBC AUTO: 92.9 FL — SIGNIFICANT CHANGE UP (ref 80–100)
MONOCYTES # BLD AUTO: 0.58 K/UL — SIGNIFICANT CHANGE UP (ref 0–0.9)
MONOCYTES NFR BLD AUTO: 8.3 % — SIGNIFICANT CHANGE UP (ref 2–14)
NEUTROPHILS # BLD AUTO: 3.32 K/UL — SIGNIFICANT CHANGE UP (ref 1.8–7.4)
NEUTROPHILS NFR BLD AUTO: 47.5 % — SIGNIFICANT CHANGE UP (ref 43–77)
NRBC # BLD AUTO: 0 K/UL — SIGNIFICANT CHANGE UP (ref 0–0)
NRBC # FLD: 0 K/UL — SIGNIFICANT CHANGE UP (ref 0–0)
NRBC BLD AUTO-RTO: 0 /100 WBCS — SIGNIFICANT CHANGE UP (ref 0–0)
PLATELET # BLD AUTO: 250 K/UL — SIGNIFICANT CHANGE UP (ref 150–400)
PMV BLD: 10.3 FL — SIGNIFICANT CHANGE UP (ref 7–13)
POTASSIUM SERPL-MCNC: 5 MMOL/L — SIGNIFICANT CHANGE UP (ref 3.5–5)
POTASSIUM SERPL-SCNC: 5 MMOL/L — SIGNIFICANT CHANGE UP (ref 3.5–5)
PROT SERPL-MCNC: 6.4 G/DL — SIGNIFICANT CHANGE UP (ref 6–8)
RBC # BLD: 4.37 M/UL — SIGNIFICANT CHANGE UP (ref 3.8–5.2)
RBC # FLD: 13.8 % — SIGNIFICANT CHANGE UP (ref 10.3–14.5)
SODIUM SERPL-SCNC: 142 MMOL/L — SIGNIFICANT CHANGE UP (ref 135–146)
WBC # BLD: 7 K/UL — SIGNIFICANT CHANGE UP (ref 3.8–10.5)
WBC # FLD AUTO: 7 K/UL — SIGNIFICANT CHANGE UP (ref 3.8–10.5)

## 2025-09-06 PROCEDURE — 99284 EMERGENCY DEPT VISIT MOD MDM: CPT | Mod: 25

## 2025-09-06 PROCEDURE — 99284 EMERGENCY DEPT VISIT MOD MDM: CPT | Mod: FS

## 2025-09-06 PROCEDURE — 99285 EMERGENCY DEPT VISIT HI MDM: CPT | Mod: GC

## 2025-09-06 PROCEDURE — 70496 CT ANGIOGRAPHY HEAD: CPT

## 2025-09-06 PROCEDURE — 70450 CT HEAD/BRAIN W/O DYE: CPT

## 2025-09-06 PROCEDURE — 70450 CT HEAD/BRAIN W/O DYE: CPT | Mod: 26,XU

## 2025-09-06 PROCEDURE — 70498 CT ANGIOGRAPHY NECK: CPT | Mod: 26

## 2025-09-06 PROCEDURE — 70498 CT ANGIOGRAPHY NECK: CPT

## 2025-09-06 PROCEDURE — 36000 PLACE NEEDLE IN VEIN: CPT | Mod: XU

## 2025-09-06 PROCEDURE — 82962 GLUCOSE BLOOD TEST: CPT

## 2025-09-06 PROCEDURE — 36415 COLL VENOUS BLD VENIPUNCTURE: CPT

## 2025-09-06 PROCEDURE — 85025 COMPLETE CBC W/AUTO DIFF WBC: CPT

## 2025-09-06 PROCEDURE — 80053 COMPREHEN METABOLIC PANEL: CPT

## 2025-09-06 PROCEDURE — 70496 CT ANGIOGRAPHY HEAD: CPT | Mod: 26

## 2025-09-06 RX ORDER — DIPHENHYDRAMINE HCL 12.5MG/5ML
50 ELIXIR ORAL ONCE
Refills: 0 | Status: COMPLETED | OUTPATIENT
Start: 2025-09-06 | End: 2025-09-06

## 2025-09-06 RX ORDER — DEXAMETHASONE 0.5 MG/1
8 TABLET ORAL ONCE
Refills: 0 | Status: COMPLETED | OUTPATIENT
Start: 2025-09-06 | End: 2025-09-06

## 2025-09-06 RX ADMIN — DEXAMETHASONE 8 MILLIGRAM(S): 0.5 TABLET ORAL at 23:18

## 2025-09-06 RX ADMIN — Medication 50 MILLIGRAM(S): at 23:18
